# Patient Record
Sex: MALE | Race: BLACK OR AFRICAN AMERICAN | NOT HISPANIC OR LATINO | URBAN - METROPOLITAN AREA
[De-identification: names, ages, dates, MRNs, and addresses within clinical notes are randomized per-mention and may not be internally consistent; named-entity substitution may affect disease eponyms.]

---

## 2022-08-04 ENCOUNTER — EMERGENCY (EMERGENCY)
Facility: HOSPITAL | Age: 42
LOS: 1 days | Discharge: HOME | End: 2022-08-04
Attending: EMERGENCY MEDICINE | Admitting: EMERGENCY MEDICINE

## 2022-08-04 VITALS
SYSTOLIC BLOOD PRESSURE: 160 MMHG | HEART RATE: 95 BPM | DIASTOLIC BLOOD PRESSURE: 100 MMHG | RESPIRATION RATE: 18 BRPM | OXYGEN SATURATION: 98 % | TEMPERATURE: 98 F

## 2022-08-04 PROCEDURE — 99284 EMERGENCY DEPT VISIT MOD MDM: CPT

## 2022-08-04 RX ORDER — ACETAMINOPHEN 500 MG
650 TABLET ORAL ONCE
Refills: 0 | Status: COMPLETED | OUTPATIENT
Start: 2022-08-04 | End: 2022-08-04

## 2022-08-04 RX ORDER — FAMOTIDINE 10 MG/ML
20 INJECTION INTRAVENOUS ONCE
Refills: 0 | Status: COMPLETED | OUTPATIENT
Start: 2022-08-04 | End: 2022-08-04

## 2022-08-04 RX ADMIN — FAMOTIDINE 20 MILLIGRAM(S): 10 INJECTION INTRAVENOUS at 22:30

## 2022-08-04 RX ADMIN — Medication 650 MILLIGRAM(S): at 22:29

## 2022-08-04 NOTE — ED ADULT NURSE NOTE - OBJECTIVE STATEMENT
Pt aox3 with steady gait. walk in pt with complaints of back pain x 2 weeks, states he fell. No difficulty ambulating. Hx htn, been without his meds x 3 days.

## 2022-08-04 NOTE — ED PROVIDER NOTE - OBJECTIVE STATEMENT
41 y/o M walked into ED c/o chronic back pain "up and down my back" and a stomach ache for unspecified amount of time. Pt found sleeping in chair. Once awoken, pt willing to provide very little information, falling asleep mid-conversation more than once. No further information given.

## 2022-08-04 NOTE — ED PROVIDER NOTE - PHYSICAL EXAMINATION
CONSTITUTIONAL: Well appearing, well nourished, NAD  ENT: Airway patent, Nasal mucosa clear. Mouth with normal mucosa.  EYES: Clear bilaterally.  RESPIRATORY: Breathing comfortably with normal RR.  CARDO: RRR  MSK: Range of motion is not limited, no deformities noted.  NEURO: Alert and oriented, no focal deficits. Gait intact.  SKIN: Skin normal color for race, warm, dry and intact. No evidence of rash.

## 2022-08-04 NOTE — ED PROVIDER NOTE - CLINICAL SUMMARY MEDICAL DECISION MAKING FREE TEXT BOX
chronic unspecified back pain, ambulating w/steady gait, treating with oral medications, d/c home with PMD f/u.

## 2022-08-04 NOTE — ED ADULT TRIAGE NOTE - CHIEF COMPLAINT QUOTE
walk in pt with complaints of back pain x 2 weeks, states he fell. No difficulty ambulating. Hx htn, been without his meds x 3 days.

## 2022-08-04 NOTE — ED PROVIDER NOTE - PATIENT PORTAL LINK FT
You can access the FollowMyHealth Patient Portal offered by [facility] by registering at the following website: [email or web address]. By joining [employer]’s FollowMyHealth portal, you will also be able to view your health information using other applications (apps) compatible with our system.

## 2022-08-08 DIAGNOSIS — M54.9 DORSALGIA, UNSPECIFIED: ICD-10-CM

## 2022-08-08 DIAGNOSIS — G89.29 OTHER CHRONIC PAIN: ICD-10-CM

## 2022-09-03 ENCOUNTER — EMERGENCY (EMERGENCY)
Facility: HOSPITAL | Age: 42
LOS: 1 days | Discharge: HOME | End: 2022-09-03
Attending: EMERGENCY MEDICINE | Admitting: EMERGENCY MEDICINE

## 2022-09-03 VITALS
SYSTOLIC BLOOD PRESSURE: 128 MMHG | OXYGEN SATURATION: 97 % | TEMPERATURE: 98 F | HEART RATE: 66 BPM | DIASTOLIC BLOOD PRESSURE: 82 MMHG | WEIGHT: 220.02 LBS | RESPIRATION RATE: 17 BRPM

## 2022-09-03 DIAGNOSIS — G89.29 OTHER CHRONIC PAIN: ICD-10-CM

## 2022-09-03 DIAGNOSIS — M54.9 DORSALGIA, UNSPECIFIED: ICD-10-CM

## 2022-09-03 DIAGNOSIS — Z59.00 HOMELESSNESS UNSPECIFIED: ICD-10-CM

## 2022-09-03 DIAGNOSIS — R46.0 VERY LOW LEVEL OF PERSONAL HYGIENE: ICD-10-CM

## 2022-09-03 PROCEDURE — 99053 MED SERV 10PM-8AM 24 HR FAC: CPT

## 2022-09-03 PROCEDURE — 99284 EMERGENCY DEPT VISIT MOD MDM: CPT

## 2022-09-03 RX ORDER — ACETAMINOPHEN 500 MG
650 TABLET ORAL ONCE
Refills: 0 | Status: COMPLETED | OUTPATIENT
Start: 2022-09-03 | End: 2022-09-03

## 2022-09-03 RX ORDER — IBUPROFEN 200 MG
600 TABLET ORAL ONCE
Refills: 0 | Status: COMPLETED | OUTPATIENT
Start: 2022-09-03 | End: 2022-09-03

## 2022-09-03 RX ADMIN — Medication 650 MILLIGRAM(S): at 02:08

## 2022-09-03 RX ADMIN — Medication 600 MILLIGRAM(S): at 02:08

## 2022-09-03 SDOH — ECONOMIC STABILITY - HOUSING INSECURITY: HOMELESSNESS UNSPECIFIED: Z59.00

## 2022-09-03 NOTE — ED PROVIDER NOTE - PATIENT PORTAL LINK FT
Called granddaughter she stated yes they are switching to [facility] You can access the FollowMyHealth Patient Portal offered by [facility] by registering at the following website: [email or web address]. By joining [employer]’s FollowMyHealth portal, you will also be able to view your health information using other applications (apps) compatible with our system.

## 2022-09-03 NOTE — ED PROVIDER NOTE - OBJECTIVE STATEMENT
41 y/o M walked into ED c/o chronic back pain "up and down my back" unclear timing, no trauma., Pt found sleeping in chair.  denies any red flag signs for back pain. has prior visits for similar.

## 2022-09-03 NOTE — ED PROVIDER NOTE - CLINICAL SUMMARY MEDICAL DECISION MAKING FREE TEXT BOX
pt presents for back pain, is undomiciled and also requesting food. no distress. old chart review shows prior visit for same

## 2022-09-16 ENCOUNTER — EMERGENCY (EMERGENCY)
Facility: HOSPITAL | Age: 42
LOS: 1 days | Discharge: HOME | End: 2022-09-16
Admitting: EMERGENCY MEDICINE

## 2022-09-16 VITALS
RESPIRATION RATE: 17 BRPM | HEART RATE: 65 BPM | OXYGEN SATURATION: 97 % | TEMPERATURE: 98 F | DIASTOLIC BLOOD PRESSURE: 83 MMHG | SYSTOLIC BLOOD PRESSURE: 123 MMHG

## 2022-09-16 DIAGNOSIS — R51.9 HEADACHE, UNSPECIFIED: ICD-10-CM

## 2022-09-16 PROCEDURE — 99283 EMERGENCY DEPT VISIT LOW MDM: CPT

## 2022-09-16 PROCEDURE — 99053 MED SERV 10PM-8AM 24 HR FAC: CPT

## 2022-09-16 RX ORDER — IBUPROFEN 200 MG
600 TABLET ORAL ONCE
Refills: 0 | Status: COMPLETED | OUTPATIENT
Start: 2022-09-16 | End: 2022-09-16

## 2022-09-16 RX ADMIN — Medication 600 MILLIGRAM(S): at 05:16

## 2022-09-16 NOTE — ED PROVIDER NOTE - CLINICAL SUMMARY MEDICAL DECISION MAKING FREE TEXT BOX
pt presents with c/o preston, intermittent x several years, unchanged. requesting motrin. no other neuro complaints.

## 2022-09-16 NOTE — ED PROVIDER NOTE - PATIENT PORTAL LINK FT
You can access the FollowMyHealth Patient Portal offered by [facility] by registering at the following website: [email or web address]. By joining [employer]’s FollowMyHealth portal, you will also be able to view your health information using other applications (apps) compatible with our system.

## 2022-09-16 NOTE — ED PROVIDER NOTE - PHYSICAL EXAMINATION
Constitutional: Well appearing, awake, alert, oriented to person, place, time/situation and in no apparent distress.  HEENT: Airway patent, NCAT  Resp: no conversational dyspnea, tachypnea, or signs of respiratory distress  Msk: ambulating with normal steady gait, no focal deficits  Neuro: A&Ox3

## 2022-09-16 NOTE — ED PROVIDER NOTE - OBJECTIVE STATEMENT
43yo M presents with c/o ha intermittent x 2 years. requesting motrin. denies any changes in ha quality or severity. denies n/v, dizziness, vision changes, numbness, tingling, weakness, any other concerns.

## 2022-09-16 NOTE — ED ADULT TRIAGE NOTE - CHIEF COMPLAINT QUOTE
Pt c/o headache x6 months. Pt states "I get headaches a lot". Pt denies visual changes, weakness or dizziness.

## 2022-10-30 ENCOUNTER — EMERGENCY (EMERGENCY)
Facility: HOSPITAL | Age: 42
LOS: 1 days | Discharge: HOME | End: 2022-10-30
Admitting: STUDENT IN AN ORGANIZED HEALTH CARE EDUCATION/TRAINING PROGRAM
Payer: SELF-PAY

## 2022-10-30 VITALS
HEART RATE: 85 BPM | SYSTOLIC BLOOD PRESSURE: 137 MMHG | DIASTOLIC BLOOD PRESSURE: 86 MMHG | WEIGHT: 220.02 LBS | OXYGEN SATURATION: 97 % | RESPIRATION RATE: 18 BRPM | HEIGHT: 71 IN | TEMPERATURE: 98 F

## 2022-10-30 PROBLEM — Z78.9 OTHER SPECIFIED HEALTH STATUS: Chronic | Status: ACTIVE | Noted: 2022-09-16

## 2022-10-30 PROCEDURE — 99283 EMERGENCY DEPT VISIT LOW MDM: CPT

## 2022-10-30 PROCEDURE — 99284 EMERGENCY DEPT VISIT MOD MDM: CPT

## 2022-10-30 NOTE — ED PROVIDER NOTE - NS ED ROS FT
CONSTITUTIONAL: Well-appearing;  in no apparent distress.   HEAD: Normocephalic; atraumatic.   EYES: PERRL; EOM intact; conjunctiva and sclera clear  ENT: normal nose; no rhinorrhea; normal pharynx with no erythema or lesions.   NECK: Supple; non-tender; no LAD  CARDIOVASCULAR: Normal S1, S2; No audible murmurs. Regular rate and rhythm.   RESPIRATORY: Breathing easily; breath sounds clear and equal bilaterally; no wheezes, rhonchi, or rales.  GI: Soft; non-distended; non-tender; no palpable organomegaly.   MSK: +lower back pain, no radiating   EXT: No cyanosis or edema; N/V intact  SKIN: Normal for age and race; warm; dry; good turgor; no apparent lesions or rash.   NEURO: A & O x 3; face symmetric; grossly unremarkable.   PSYCHOLOGICAL: The patient’s mood and manner are appropriate.

## 2022-10-30 NOTE — ED PROVIDER NOTE - CLINICAL SUMMARY MEDICAL DECISION MAKING FREE TEXT BOX
patient is a 43 y/o male presenting for evaluation of b/l lower back pain. hx of chronic back pain. patient notes has bouts of back pain that come/go, and notes increased back pain over the last few days. notes increased pain after sitting for long periods of time. denies radiation of pain. denies loss of bowel/bladder control. denies f/c/n/v. no recent trauma. denies pain different than his usual back pain. states when he gets rx for ibuprofen and Tylenol, he notes improvement. no further concerns.    well appearing, NAD  vitals noted  PE unremarkable aside from paraspinal pain to palpation of the lumbar spine  no sign of neuro compromise  no midline ttp   FROM of b/l LE.     will treat with NSAIDs.

## 2022-10-30 NOTE — ED PROVIDER NOTE - OBJECTIVE STATEMENT
patient is a 43 y/o male presenting for evaluation of b/l lower back pain. hx of chronic back pain. patient notes has bouts of back pain that come/go, and notes increased back pain over the last few days. notes increased pain after sitting for long periods of time. denies radiation of pain. denies loss of bowel/bladder control. denies f/c/n/v. no recent trauma. denies pain different than his usual back pain. states when he gets rx for ibuprofen and Tylenol, he notes improvement. no further concerns.

## 2022-10-30 NOTE — ED PROVIDER NOTE - PHYSICAL EXAMINATION
CONSTITUTIONAL: Well-appearing;  in no apparent distress.   HEAD: Normocephalic; atraumatic.   EYES: PERRL; EOM intact; conjunctiva and sclera clear  ENT: normal nose; no rhinorrhea; normal pharynx with no erythema or lesions.   NECK: Supple; non-tender; no LAD  CARDIOVASCULAR: Normal S1, S2; No audible murmurs. Regular rate and rhythm.   RESPIRATORY: Breathing easily; breath sounds clear and equal bilaterally; no wheezes, rhonchi, or rales.  GI: Soft; non-distended; non-tender; no palpable organomegaly.   MSK: +ttp of b/l paraspinal region of the lumbar spine without ttp of the midline. FROM at all extremities, normal tone w/o parestehsias, numbness/tingling  EXT: No cyanosis or edema; N/V intact  SKIN: Normal for age and race; warm; dry; good turgor; no apparent lesions or rash.   NEURO: A & O x 3; face symmetric; grossly unremarkable.   PSYCHOLOGICAL: The patient’s mood and manner are appropriate.

## 2022-10-30 NOTE — ED ADULT NURSE NOTE - OBJECTIVE STATEMENT
Patient came to ER with c/o back pain and discomfort with movement. Patient noted to ambulate and move without difficulty.

## 2022-10-30 NOTE — ED PROVIDER NOTE - PATIENT PORTAL LINK FT
You can access the FollowMyHealth Patient Portal offered by [facility] by registering at the following website: [email or web address]. By joining [email or web address]’s FollowMyHealth portal, you will also be able to view your health information using other applications (apps) compatible with our system.

## 2022-10-31 DIAGNOSIS — M54.50 LOW BACK PAIN, UNSPECIFIED: ICD-10-CM

## 2022-10-31 DIAGNOSIS — G89.29 OTHER CHRONIC PAIN: ICD-10-CM

## 2022-11-07 ENCOUNTER — EMERGENCY (EMERGENCY)
Facility: HOSPITAL | Age: 42
LOS: 1 days | Discharge: HOME | End: 2022-11-07
Admitting: STUDENT IN AN ORGANIZED HEALTH CARE EDUCATION/TRAINING PROGRAM
Payer: SELF-PAY

## 2022-11-07 VITALS
RESPIRATION RATE: 18 BRPM | SYSTOLIC BLOOD PRESSURE: 132 MMHG | TEMPERATURE: 98 F | HEART RATE: 72 BPM | OXYGEN SATURATION: 98 % | DIASTOLIC BLOOD PRESSURE: 84 MMHG | WEIGHT: 220.02 LBS | HEIGHT: 71 IN

## 2022-11-07 DIAGNOSIS — R51.9 HEADACHE, UNSPECIFIED: ICD-10-CM

## 2022-11-07 DIAGNOSIS — G89.29 OTHER CHRONIC PAIN: ICD-10-CM

## 2022-11-07 DIAGNOSIS — M54.9 DORSALGIA, UNSPECIFIED: ICD-10-CM

## 2022-11-07 PROCEDURE — 99282 EMERGENCY DEPT VISIT SF MDM: CPT

## 2022-11-07 PROCEDURE — 99284 EMERGENCY DEPT VISIT MOD MDM: CPT

## 2022-11-07 RX ORDER — IBUPROFEN 200 MG
600 TABLET ORAL ONCE
Refills: 0 | Status: DISCONTINUED | OUTPATIENT
Start: 2022-11-07 | End: 2022-11-11

## 2022-11-07 NOTE — ED ADULT TRIAGE NOTE - GLASGOW COMA SCALE: SCORE, MLM
How Severe Are Your Spot(S)?: mild Have Your Spot(S) Been Treated In The Past?: has not been treated Hpi Title: Evaluation of Skin Lesions 15

## 2022-11-07 NOTE — ED PROVIDER NOTE - CLINICAL SUMMARY MEDICAL DECISION MAKING FREE TEXT BOX
41 yo m c/o chronic has and chronic back pain. pt states he comes to the ED for motrin or tylenol because he cannot afford it. Reports chronic HAs for months, had CT in the past that was neg. Pt also c/o generalized back pain that comes and goes. Denies trauma. Denies fever, chills, n/v, dizziness, neck pain, abd pain, dysuria, hematuria, incontinence. VSs. Well appearing. Neck supple, Neurologically intact. No tenderness to back. Walking normally. Given motrin. Encouraged f/u

## 2022-11-07 NOTE — ED PROVIDER NOTE - OBJECTIVE STATEMENT
43 yo m c/o chronic has and chronic back pain. pt states he comes to the ED for motrin or tylenol because he cannot afford it. Reports chronic HAs for months, had CT in the past that was neg. Pt also c/o generalized back pain that comes and goes. Denies trauma. Denies fever, chills, n/v, dizziness, neck pain, abd pain, dysuria, hematuria, incontinence.

## 2022-11-07 NOTE — ED PROVIDER NOTE - PATIENT PORTAL LINK FT
You can access the FollowMyHealth Patient Portal offered by [facility] by registering at the following website: [email or web address]. By joining [employer]’s FollowMyHealth portal, you will also be able to view your health information using other applications (apps) compatible with our system.

## 2022-11-07 NOTE — ED PROVIDER NOTE - PHYSICAL EXAMINATION
CONSTITUTIONAL: Well-appearing;  in no apparent distress.   HEAD: Normocephalic; atraumatic.   EYES: PERRL; EOM intact; conjunctiva and sclera clear  ENT: normal nose; no rhinorrhea; normal pharynx with no erythema or lesions.   NECK: Supple; non-tender;   CARDIOVASCULAR: rrr,  RESPIRATORY: Breathing easily;   MSK: FROM at all extremities, normal tone   EXT: No cyanosis or edema; N/V intact  SKIN: Normal for age and race; warm; dry; good turgor; no apparent lesions or rash.  neuro: AAO x 3, CN II-XII intact, normal speech, strength 5/5 bilateral upper and lower extremities, sensation intact, cerebellum intact, no ataxia, follows commands appropriately

## 2022-11-13 ENCOUNTER — INPATIENT (INPATIENT)
Facility: HOSPITAL | Age: 42
LOS: 2 days | Discharge: HOME | DRG: 552 | End: 2022-11-16
Attending: HOSPITALIST | Admitting: HOSPITALIST
Payer: SELF-PAY

## 2022-11-13 VITALS
TEMPERATURE: 98 F | SYSTOLIC BLOOD PRESSURE: 134 MMHG | HEIGHT: 71 IN | WEIGHT: 220.02 LBS | DIASTOLIC BLOOD PRESSURE: 88 MMHG | HEART RATE: 56 BPM | RESPIRATION RATE: 16 BRPM | OXYGEN SATURATION: 97 %

## 2022-11-13 PROCEDURE — 99285 EMERGENCY DEPT VISIT HI MDM: CPT

## 2022-11-13 PROCEDURE — 93010 ELECTROCARDIOGRAM REPORT: CPT

## 2022-11-13 RX ORDER — SODIUM CHLORIDE 9 MG/ML
1000 INJECTION INTRAMUSCULAR; INTRAVENOUS; SUBCUTANEOUS ONCE
Refills: 0 | Status: COMPLETED | OUTPATIENT
Start: 2022-11-13 | End: 2022-11-13

## 2022-11-13 RX ORDER — METOCLOPRAMIDE HCL 10 MG
10 TABLET ORAL ONCE
Refills: 0 | Status: COMPLETED | OUTPATIENT
Start: 2022-11-13 | End: 2022-11-13

## 2022-11-13 RX ORDER — DIPHENHYDRAMINE HCL 50 MG
25 CAPSULE ORAL ONCE
Refills: 0 | Status: COMPLETED | OUTPATIENT
Start: 2022-11-13 | End: 2022-11-13

## 2022-11-13 RX ORDER — KETOROLAC TROMETHAMINE 30 MG/ML
15 SYRINGE (ML) INJECTION ONCE
Refills: 0 | Status: DISCONTINUED | OUTPATIENT
Start: 2022-11-13 | End: 2022-11-13

## 2022-11-13 RX ADMIN — Medication 15 MILLIGRAM(S): at 23:56

## 2022-11-13 RX ADMIN — Medication 25 MILLIGRAM(S): at 23:56

## 2022-11-13 NOTE — ED ADULT NURSE NOTE - NSIMPLEMENTINTERV_GEN_ALL_ED
Implemented All Fall with Harm Risk Interventions:  [place] to call system. Call bell, personal items and telephone within reach. Instruct patient to call for assistance. Room bathroom lighting operational. Non-slip footwear when patient is off stretcher. Physically safe environment: no spills, clutter or unnecessary equipment. Stretcher in lowest position, wheels locked, appropriate side rails in place. Provide visual cue, wrist band, yellow gown, etc. Monitor gait and stability. Monitor for mental status changes and reorient to person, place, and time. Review medications for side effects contributing to fall risk. Reinforce activity limits and safety measures with patient and family. Provide visual clues: red socks. Implemented All Fall with Harm Risk Interventions:  [place] to call system. Call bell, personal items and telephone within reach. Instruct patient to call for assistance. Room bathroom lighting operational. Non-slip footwear when patient is off stretcher. Physically safe environment: no spills, clutter or unnecessary equipment. Stretcher in lowest position, wheels locked, appropriate side rails in place. Provide visual cue, wrist band, yellow gown, etc. Monitor gait and stability. Monitor for mental status changes and reorient to person, place, and time. Review medications for side effects contributing to fall risk. Reinforce activity limits and safety measures with patient and family. Provide visual clues: red socks. Implemented All Fall with Harm Risk Interventions:  [place] to call system. Call bell, personal items and telephone within reach. Instruct patient to call for assistance. Room bathroom lighting operational. Non-slip footwear when patient is off stretcher. Physically safe environment: no spills, clutter or unnecessary equipment. Stretcher in lowest position, wheels locked, appropriate side rails in place. Provide visual cue, wrist band, yellow gown, etc. Monitor gait and stability. Monitor for mental status changes and reorient to person, place, and time. Review medications for side effects contributing to fall risk. Reinforce activity limits and safety measures with patient and family. Provide visual clues: red socks.

## 2022-11-13 NOTE — ED ADULT NURSE NOTE - OBJECTIVE STATEMENT
41 y/o male presents to the ED ambulatory endorsing symptoms of diffuse back pain and HA x6 months. Back is tender to palpation. A/Ox4. Ambulatory. PMH: none. Patient denies any daily medications. Patient states that normally the pain is 3/10 but it has increased to 5/10 and that's what made him come to the ED. Patient endorses subjective fever and chills. Patient states on 11/12/22, he took acetaminophen. Patient endorses recent fall two weeks and positive headstrike. Safety measures maintained, call-bell within reach, side rails intact and bed in the lowest position. 43 y/o male presents to the ED ambulatory endorsing symptoms of diffuse back pain and HA x6 months. Back is tender to palpation. A/Ox4. Ambulatory. PMH: none. Patient denies any daily medications. Patient states that normally the pain is 3/10 but it has increased to 5/10 and that's what made him come to the ED. Patient endorses subjective fever and chills. Patient states on 11/12/22, he took acetaminophen. Patient endorses recent fall two weeks and positive headstrike. Safety measures maintained, call-bell within reach, side rails intact and bed in the lowest position.

## 2022-11-13 NOTE — ED ADULT NURSE NOTE - CHPI ED NUR SYMPTOMS NEG
no difficulty bearing weight/no fatigue/no motor function loss/no neck tenderness/no numbness/no tingling

## 2022-11-13 NOTE — ED ADULT NURSE NOTE - CHIEF COMPLAINT
Per patient's request detailed voicemail was left for patient regarding lab results below. Patient form was faxed to insurance and hard copy was mailed out to patient.    The patient is a 42y Male complaining of back pain general.

## 2022-11-14 DIAGNOSIS — F31.9 BIPOLAR DISORDER, UNSPECIFIED: ICD-10-CM

## 2022-11-14 DIAGNOSIS — R00.1 BRADYCARDIA, UNSPECIFIED: ICD-10-CM

## 2022-11-14 DIAGNOSIS — E16.2 HYPOGLYCEMIA, UNSPECIFIED: ICD-10-CM

## 2022-11-14 DIAGNOSIS — D72.829 ELEVATED WHITE BLOOD CELL COUNT, UNSPECIFIED: ICD-10-CM

## 2022-11-14 DIAGNOSIS — G43.909 MIGRAINE, UNSPECIFIED, NOT INTRACTABLE, WITHOUT STATUS MIGRAINOSUS: ICD-10-CM

## 2022-11-14 LAB
A1C WITH ESTIMATED AVERAGE GLUCOSE RESULT: 5.3 % — SIGNIFICANT CHANGE UP (ref 4–5.6)
ALBUMIN SERPL ELPH-MCNC: 3.6 G/DL — SIGNIFICANT CHANGE UP (ref 3.3–5)
ALBUMIN SERPL ELPH-MCNC: 4.2 G/DL — SIGNIFICANT CHANGE UP (ref 3.3–5)
ALP SERPL-CCNC: 60 U/L — SIGNIFICANT CHANGE UP (ref 40–120)
ALP SERPL-CCNC: 73 U/L — SIGNIFICANT CHANGE UP (ref 40–120)
ALT FLD-CCNC: 13 U/L — SIGNIFICANT CHANGE UP (ref 10–45)
ALT FLD-CCNC: 15 U/L — SIGNIFICANT CHANGE UP (ref 10–45)
AMPHET UR-MCNC: NEGATIVE — SIGNIFICANT CHANGE UP
ANION GAP SERPL CALC-SCNC: 11 MMOL/L — SIGNIFICANT CHANGE UP (ref 5–17)
ANION GAP SERPL CALC-SCNC: 12 MMOL/L — SIGNIFICANT CHANGE UP (ref 5–17)
APPEARANCE UR: CLEAR — SIGNIFICANT CHANGE UP
AST SERPL-CCNC: 13 U/L — SIGNIFICANT CHANGE UP (ref 10–40)
AST SERPL-CCNC: 23 U/L — SIGNIFICANT CHANGE UP (ref 10–40)
BACTERIA # UR AUTO: NEGATIVE — SIGNIFICANT CHANGE UP
BARBITURATES UR SCN-MCNC: NEGATIVE — SIGNIFICANT CHANGE UP
BASOPHILS # BLD AUTO: 0.07 K/UL — SIGNIFICANT CHANGE UP (ref 0–0.2)
BASOPHILS # BLD AUTO: 0.08 K/UL — SIGNIFICANT CHANGE UP (ref 0–0.2)
BASOPHILS NFR BLD AUTO: 0.5 % — SIGNIFICANT CHANGE UP (ref 0–2)
BASOPHILS NFR BLD AUTO: 0.7 % — SIGNIFICANT CHANGE UP (ref 0–2)
BENZODIAZ UR-MCNC: NEGATIVE — SIGNIFICANT CHANGE UP
BILIRUB SERPL-MCNC: 0.8 MG/DL — SIGNIFICANT CHANGE UP (ref 0.2–1.2)
BILIRUB UR-MCNC: ABNORMAL
BUN SERPL-MCNC: 13 MG/DL — SIGNIFICANT CHANGE UP (ref 7–23)
BUN SERPL-MCNC: 14 MG/DL — SIGNIFICANT CHANGE UP (ref 7–23)
C PEPTIDE SERPL-MCNC: 6.2 NG/ML — HIGH (ref 1.1–4.4)
CALCIUM SERPL-MCNC: 8.6 MG/DL — SIGNIFICANT CHANGE UP (ref 8.4–10.5)
CALCIUM SERPL-MCNC: 9.3 MG/DL — SIGNIFICANT CHANGE UP (ref 8.4–10.5)
CHLORIDE SERPL-SCNC: 101 MMOL/L — SIGNIFICANT CHANGE UP (ref 96–108)
CHLORIDE SERPL-SCNC: 104 MMOL/L — SIGNIFICANT CHANGE UP (ref 96–108)
CK SERPL-CCNC: 313 U/L — HIGH (ref 30–200)
CO2 SERPL-SCNC: 24 MMOL/L — SIGNIFICANT CHANGE UP (ref 22–31)
CO2 SERPL-SCNC: 27 MMOL/L — SIGNIFICANT CHANGE UP (ref 22–31)
COCAINE METAB.OTHER UR-MCNC: NEGATIVE — SIGNIFICANT CHANGE UP
COLOR SPEC: YELLOW — SIGNIFICANT CHANGE UP
CREAT SERPL-MCNC: 0.72 MG/DL — SIGNIFICANT CHANGE UP (ref 0.5–1.3)
CREAT SERPL-MCNC: 0.76 MG/DL — SIGNIFICANT CHANGE UP (ref 0.5–1.3)
DIFF PNL FLD: NEGATIVE — SIGNIFICANT CHANGE UP
EGFR: 114 ML/MIN/1.73M2 — SIGNIFICANT CHANGE UP
EGFR: 116 ML/MIN/1.73M2 — SIGNIFICANT CHANGE UP
EOSINOPHIL # BLD AUTO: 0.76 K/UL — HIGH (ref 0–0.5)
EOSINOPHIL # BLD AUTO: 0.84 K/UL — HIGH (ref 0–0.5)
EOSINOPHIL NFR BLD AUTO: 5.1 % — SIGNIFICANT CHANGE UP (ref 0–6)
EOSINOPHIL NFR BLD AUTO: 8 % — HIGH (ref 0–6)
EPI CELLS # UR: 0 /HPF — SIGNIFICANT CHANGE UP
ESTIMATED AVERAGE GLUCOSE: 105 MG/DL — SIGNIFICANT CHANGE UP (ref 68–114)
FLUAV AG NPH QL: SIGNIFICANT CHANGE UP
FLUBV AG NPH QL: SIGNIFICANT CHANGE UP
GLUCOSE BLDC GLUCOMTR-MCNC: 100 MG/DL — HIGH (ref 70–99)
GLUCOSE BLDC GLUCOMTR-MCNC: 109 MG/DL — HIGH (ref 70–99)
GLUCOSE BLDC GLUCOMTR-MCNC: 149 MG/DL — HIGH (ref 70–99)
GLUCOSE BLDC GLUCOMTR-MCNC: 150 MG/DL — HIGH (ref 70–99)
GLUCOSE BLDC GLUCOMTR-MCNC: 67 MG/DL — LOW (ref 70–99)
GLUCOSE BLDC GLUCOMTR-MCNC: 85 MG/DL — SIGNIFICANT CHANGE UP (ref 70–99)
GLUCOSE BLDC GLUCOMTR-MCNC: 94 MG/DL — SIGNIFICANT CHANGE UP (ref 70–99)
GLUCOSE SERPL-MCNC: 126 MG/DL — HIGH (ref 70–99)
GLUCOSE SERPL-MCNC: 66 MG/DL — LOW (ref 70–99)
GLUCOSE UR QL: NEGATIVE — SIGNIFICANT CHANGE UP
HCT VFR BLD CALC: 45.6 % — SIGNIFICANT CHANGE UP (ref 39–50)
HCT VFR BLD CALC: 49.6 % — SIGNIFICANT CHANGE UP (ref 39–50)
HGB BLD-MCNC: 14.8 G/DL — SIGNIFICANT CHANGE UP (ref 13–17)
HGB BLD-MCNC: 16.2 G/DL — SIGNIFICANT CHANGE UP (ref 13–17)
HYALINE CASTS # UR AUTO: 1 /LPF — SIGNIFICANT CHANGE UP (ref 0–2)
IMM GRANULOCYTES NFR BLD AUTO: 0.6 % — SIGNIFICANT CHANGE UP (ref 0–0.9)
IMM GRANULOCYTES NFR BLD AUTO: 0.7 % — SIGNIFICANT CHANGE UP (ref 0–0.9)
KETONES UR-MCNC: ABNORMAL
LEUKOCYTE ESTERASE UR-ACNC: NEGATIVE — SIGNIFICANT CHANGE UP
LYMPHOCYTES # BLD AUTO: 16.6 % — SIGNIFICANT CHANGE UP (ref 13–44)
LYMPHOCYTES # BLD AUTO: 2.26 K/UL — SIGNIFICANT CHANGE UP (ref 1–3.3)
LYMPHOCYTES # BLD AUTO: 2.47 K/UL — SIGNIFICANT CHANGE UP (ref 1–3.3)
LYMPHOCYTES # BLD AUTO: 21.5 % — SIGNIFICANT CHANGE UP (ref 13–44)
MAGNESIUM SERPL-MCNC: 1.9 MG/DL — SIGNIFICANT CHANGE UP (ref 1.6–2.6)
MCHC RBC-ENTMCNC: 29.6 PG — SIGNIFICANT CHANGE UP (ref 27–34)
MCHC RBC-ENTMCNC: 29.7 PG — SIGNIFICANT CHANGE UP (ref 27–34)
MCHC RBC-ENTMCNC: 32.5 GM/DL — SIGNIFICANT CHANGE UP (ref 32–36)
MCHC RBC-ENTMCNC: 32.7 GM/DL — SIGNIFICANT CHANGE UP (ref 32–36)
MCV RBC AUTO: 90.7 FL — SIGNIFICANT CHANGE UP (ref 80–100)
MCV RBC AUTO: 91.4 FL — SIGNIFICANT CHANGE UP (ref 80–100)
METHADONE UR-MCNC: NEGATIVE — SIGNIFICANT CHANGE UP
MONOCYTES # BLD AUTO: 1.27 K/UL — HIGH (ref 0–0.9)
MONOCYTES # BLD AUTO: 1.36 K/UL — HIGH (ref 0–0.9)
MONOCYTES NFR BLD AUTO: 12.1 % — SIGNIFICANT CHANGE UP (ref 2–14)
MONOCYTES NFR BLD AUTO: 9.2 % — SIGNIFICANT CHANGE UP (ref 2–14)
NEUTROPHILS # BLD AUTO: 10.09 K/UL — HIGH (ref 1.8–7.4)
NEUTROPHILS # BLD AUTO: 5.99 K/UL — SIGNIFICANT CHANGE UP (ref 1.8–7.4)
NEUTROPHILS NFR BLD AUTO: 57 % — SIGNIFICANT CHANGE UP (ref 43–77)
NEUTROPHILS NFR BLD AUTO: 68 % — SIGNIFICANT CHANGE UP (ref 43–77)
NITRITE UR-MCNC: NEGATIVE — SIGNIFICANT CHANGE UP
NRBC # BLD: 0 /100 WBCS — SIGNIFICANT CHANGE UP (ref 0–0)
NT-PROBNP SERPL-SCNC: 24 PG/ML — SIGNIFICANT CHANGE UP (ref 0–300)
OPIATES UR-MCNC: NEGATIVE — SIGNIFICANT CHANGE UP
OXYCODONE UR-MCNC: NEGATIVE — SIGNIFICANT CHANGE UP
PCP SPEC-MCNC: SIGNIFICANT CHANGE UP
PCP UR-MCNC: NEGATIVE — SIGNIFICANT CHANGE UP
PH UR: 6 — SIGNIFICANT CHANGE UP (ref 5–8)
PHOSPHATE SERPL-MCNC: 2.9 MG/DL — SIGNIFICANT CHANGE UP (ref 2.5–4.5)
PLATELET # BLD AUTO: 448 K/UL — HIGH (ref 150–400)
PLATELET # BLD AUTO: 486 K/UL — HIGH (ref 150–400)
POTASSIUM SERPL-MCNC: 3.3 MMOL/L — LOW (ref 3.5–5.3)
POTASSIUM SERPL-MCNC: 3.8 MMOL/L — SIGNIFICANT CHANGE UP (ref 3.5–5.3)
POTASSIUM SERPL-SCNC: 3.3 MMOL/L — LOW (ref 3.5–5.3)
POTASSIUM SERPL-SCNC: 3.8 MMOL/L — SIGNIFICANT CHANGE UP (ref 3.5–5.3)
PROT SERPL-MCNC: 6.2 G/DL — SIGNIFICANT CHANGE UP (ref 6–8.3)
PROT SERPL-MCNC: 7.2 G/DL — SIGNIFICANT CHANGE UP (ref 6–8.3)
PROT UR-MCNC: ABNORMAL
RBC # BLD: 4.99 M/UL — SIGNIFICANT CHANGE UP (ref 4.2–5.8)
RBC # BLD: 5.47 M/UL — SIGNIFICANT CHANGE UP (ref 4.2–5.8)
RBC # FLD: 14.9 % — HIGH (ref 10.3–14.5)
RBC # FLD: 15 % — HIGH (ref 10.3–14.5)
RBC CASTS # UR COMP ASSIST: 5 /HPF — HIGH (ref 0–4)
RSV RNA NPH QL NAA+NON-PROBE: SIGNIFICANT CHANGE UP
SARS-COV-2 RNA SPEC QL NAA+PROBE: SIGNIFICANT CHANGE UP
SODIUM SERPL-SCNC: 139 MMOL/L — SIGNIFICANT CHANGE UP (ref 135–145)
SODIUM SERPL-SCNC: 140 MMOL/L — SIGNIFICANT CHANGE UP (ref 135–145)
SP GR SPEC: 1.04 — HIGH (ref 1.01–1.02)
THC UR QL: POSITIVE
TROPONIN T, HIGH SENSITIVITY RESULT: 9 NG/L — SIGNIFICANT CHANGE UP (ref 0–51)
TSH SERPL-MCNC: 1.84 UIU/ML — SIGNIFICANT CHANGE UP (ref 0.27–4.2)
UROBILINOGEN FLD QL: ABNORMAL
VIT B12 SERPL-MCNC: <150 PG/ML — LOW (ref 232–1245)
WBC # BLD: 10.5 K/UL — SIGNIFICANT CHANGE UP (ref 3.8–10.5)
WBC # BLD: 14.85 K/UL — HIGH (ref 3.8–10.5)
WBC # FLD AUTO: 10.5 K/UL — SIGNIFICANT CHANGE UP (ref 3.8–10.5)
WBC # FLD AUTO: 14.85 K/UL — HIGH (ref 3.8–10.5)
WBC UR QL: 2 /HPF — SIGNIFICANT CHANGE UP (ref 0–5)

## 2022-11-14 PROCEDURE — 71046 X-RAY EXAM CHEST 2 VIEWS: CPT | Mod: 26

## 2022-11-14 PROCEDURE — 99223 1ST HOSP IP/OBS HIGH 75: CPT

## 2022-11-14 PROCEDURE — 70450 CT HEAD/BRAIN W/O DYE: CPT | Mod: 26,MA

## 2022-11-14 RX ORDER — DEXTROSE 50 % IN WATER 50 %
12.5 SYRINGE (ML) INTRAVENOUS ONCE
Refills: 0 | Status: DISCONTINUED | OUTPATIENT
Start: 2022-11-14 | End: 2022-11-16

## 2022-11-14 RX ORDER — POTASSIUM CHLORIDE 20 MEQ
40 PACKET (EA) ORAL EVERY 4 HOURS
Refills: 0 | Status: COMPLETED | OUTPATIENT
Start: 2022-11-14 | End: 2022-11-14

## 2022-11-14 RX ORDER — ACETAMINOPHEN 500 MG
650 TABLET ORAL EVERY 6 HOURS
Refills: 0 | Status: DISCONTINUED | OUTPATIENT
Start: 2022-11-14 | End: 2022-11-16

## 2022-11-14 RX ORDER — INFLUENZA VIRUS VACCINE 15; 15; 15; 15 UG/.5ML; UG/.5ML; UG/.5ML; UG/.5ML
0.5 SUSPENSION INTRAMUSCULAR ONCE
Refills: 0 | Status: DISCONTINUED | OUTPATIENT
Start: 2022-11-14 | End: 2022-11-16

## 2022-11-14 RX ORDER — DEXTROSE 50 % IN WATER 50 %
25 SYRINGE (ML) INTRAVENOUS ONCE
Refills: 0 | Status: DISCONTINUED | OUTPATIENT
Start: 2022-11-14 | End: 2022-11-16

## 2022-11-14 RX ORDER — DEXTROSE 50 % IN WATER 50 %
15 SYRINGE (ML) INTRAVENOUS ONCE
Refills: 0 | Status: DISCONTINUED | OUTPATIENT
Start: 2022-11-14 | End: 2022-11-16

## 2022-11-14 RX ORDER — SODIUM CHLORIDE 9 MG/ML
1000 INJECTION, SOLUTION INTRAVENOUS
Refills: 0 | Status: DISCONTINUED | OUTPATIENT
Start: 2022-11-14 | End: 2022-11-16

## 2022-11-14 RX ORDER — GLUCAGON INJECTION, SOLUTION 0.5 MG/.1ML
1 INJECTION, SOLUTION SUBCUTANEOUS ONCE
Refills: 0 | Status: DISCONTINUED | OUTPATIENT
Start: 2022-11-14 | End: 2022-11-16

## 2022-11-14 RX ADMIN — Medication 15 MILLIGRAM(S): at 00:10

## 2022-11-14 RX ADMIN — SODIUM CHLORIDE 1000 MILLILITER(S): 9 INJECTION INTRAMUSCULAR; INTRAVENOUS; SUBCUTANEOUS at 00:51

## 2022-11-14 RX ADMIN — Medication 40 MILLIEQUIVALENT(S): at 13:38

## 2022-11-14 RX ADMIN — Medication 10 MILLIGRAM(S): at 00:00

## 2022-11-14 RX ADMIN — SODIUM CHLORIDE 200 MILLILITER(S): 9 INJECTION, SOLUTION INTRAVENOUS at 06:51

## 2022-11-14 RX ADMIN — Medication 40 MILLIEQUIVALENT(S): at 10:52

## 2022-11-14 NOTE — H&P ADULT - ASSESSMENT
42M c hx bipolar disorder, migraine, htn, chronic back pain, current smoker, pw bradycardia, hypoglycemia, leukocytosis

## 2022-11-14 NOTE — PATIENT PROFILE ADULT - FALL HARM RISK - UNIVERSAL INTERVENTIONS
Bed in lowest position, wheels locked, appropriate side rails in place/Call bell, personal items and telephone in reach/Instruct patient to call for assistance before getting out of bed or chair/Non-slip footwear when patient is out of bed/[place] to call system/Physically safe environment - no spills, clutter or unnecessary equipment/Purposeful Proactive Rounding/Room/bathroom lighting operational, light cord in reach Bed in lowest position, wheels locked, appropriate side rails in place/Call bell, personal items and telephone in reach/Instruct patient to call for assistance before getting out of bed or chair/Non-slip footwear when patient is out of bed/[place] to call system/Physically safe environment - no spills, clutter or unnecessary equipment/Purposeful Proactive Rounding/Room/bathroom lighting operational, light cord in reach Bed in lowest position, wheels locked, appropriate side rails in place/Call bell, personal items and telephone in reach/Instruct patient to call for assistance before getting out of bed or chair/Non-slip footwear when patient is out of bed/[place] to call system/Physically safe environment - no spills, clutter or unnecessary equipment/Purposeful Proactive Rounding/Room/bathroom lighting operational, light cord in reach

## 2022-11-14 NOTE — ED CLERICAL - DIVISION
[facility]... [facility]... [facility]...

## 2022-11-14 NOTE — ED ADULT NURSE REASSESSMENT NOTE - NS ED NURSE REASSESS COMMENT FT1
Patient's FS 67. Patient given 2 cups of juice and crackers. NP Rodericki at bedside. NP states they will put it in hypoglycemic protocol.

## 2022-11-14 NOTE — H&P ADULT - NSHPLABSRESULTS_GEN_ALL_CORE
Personally reviewed old records.  Personally reviewed labs.  Personally reviewed imaging.  Personally reviewed EKG. NSR rate 38, . . <1mm DUNCAN in V2-V3. No TW changes or Q waves.                          16.2   14.85 )-----------( 486      ( 2022 23:58 )             49.6       11-13    139  |  101  |  14  ----------------------------<  66<L>  3.8   |  27  |  0.72    Ca    9.3      2022 23:58    TPro  7.2  /  Alb  4.2  /  TBili  0.8  /  DBili  x   /  AST  23  /  ALT  15  /  AlkPhos  73  11-13            LIVER FUNCTIONS - ( 2022 23:58 )  Alb: 4.2 g/dL / Pro: 7.2 g/dL / ALK PHOS: 73 U/L / ALT: 15 U/L / AST: 23 U/L / GGT: x                 Urinalysis Basic - ( 2022 02:35 )    Color: Yellow / Appearance: Clear / S.036 / pH: x  Gluc: x / Ketone: Small  / Bili: Small / Urobili: 6 mg/dL   Blood: x / Protein: Trace / Nitrite: Negative   Leuk Esterase: Negative / RBC: 5 /hpf / WBC 2 /HPF   Sq Epi: x / Non Sq Epi: 0 /hpf / Bacteria: Negative

## 2022-11-14 NOTE — ED ADULT NURSE REASSESSMENT NOTE - NS ED NURSE REASSESS COMMENT FT1
Patient is a/ox3, sleeping in the stretcher. Patient is aroused to verbal and painful stimuli. Patient is able to follow commands. Patient is on CM, NSR 70s. Repeat FS is 150. Safety measures maintained, red socks on, call bell within reach and side rails intact.

## 2022-11-14 NOTE — CHART NOTE - NSCHARTNOTEFT_GEN_A_CORE
Medicine Np note    CC: AMS    Notified by RN That patient with change in mental status  Evaluated the pta t bedside, pt currently a&ox4, follows verbal commands, denies, HA, dizziness, SOB, CP  ED note reviewed, patient with lethargy from Mn  CTH negative for acute bleed/infarct  F/S done now with result 66 mg/dl  Serum Blood glucose 66 mg/dl from MN  Patient back to baseline after having juice and snacks  No hx of DM per pt    # Symptomatic Hypoglycemia  F/S done now with result 66 mg/dl  Serum Blood glucose 66 mg/dl from MN  Patient back to baseline after having juice and snacks  F/S q6 hourly  Hypoglycemia protocol initiated,  SB to SR on tele  Continue tele  a1c ordered  Will continue to monitor  Will sign out to day team    Vielka Weir P BC  [id number] Medicine Np note    CC: AMS    Notified by RN That patient with change in mental status  Evaluated the pta t bedside, pt currently a&ox4, follows verbal commands, denies, HA, dizziness, SOB, CP  ED note reviewed, patient with lethargy from Mn  CTH negative for acute bleed/infarct  F/S done now with result 66 mg/dl  Serum Blood glucose 66 mg/dl from MN  Patient back to baseline after having juice and snacks  No hx of DM per pt    # Symptomatic Hypoglycemia  F/S done now with result 66 mg/dl  Serum Blood glucose 66 mg/dl from MN  Patient back to baseline after having juice and snacks  F/S q6 hourly  Hypoglycemia protocol initiated,  SB to SR on tele  Continue tele  a1c ordered  Will continue to monitor  Will sign out to day team    Vielka Weir P BC  [id number] Medicine Np note    CC: AMS    Notified by RN That patient with change in mental status  Evaluated the pta t bedside, pt currently a&ox4, follows verbal commands, denies, HA, dizziness, SOB, CP  ED note reviewed, patient with lethargy from Mn  CTH negative for acute bleed/infarct  F/S done now with result 66 mg/dl  Serum Blood glucose 66 mg/dl from MN  Patient back to baseline after having juice and snacks  No hx of DM per pt    # Symptomatic Hypoglycemia  F/S done now with result 66 mg/dl  Serum Blood glucose 66 mg/dl from MN  Patient back to baseline after having juice and snacks  F/S q6 hourly  Hypoglycemia protocol initiated,  SB to SR on tele  Continue tele  a1c ordered  Will continue to monitor  Will sign out to day team    Vielka Weir P BC  [id number]

## 2022-11-14 NOTE — PATIENT PROFILE ADULT - NSPROGENSOURCEINFO_GEN_A_NUR

## 2022-11-14 NOTE — ED PROVIDER NOTE - CLINICAL SUMMARY MEDICAL DECISION MAKING FREE TEXT BOX
42Y M PMH headaches presenting with several months of back pain and headaches, worsening today. He reports falling 2 weeks ago but did not seek medical care at that time. Low back pain is chronic. Patient appears lethargic, requiring stimulation to be aroused. Salvador to 40s on vitals check, placed on monitor. EKG obtained showing marked sinus salvador, intervals within normal limits.

## 2022-11-14 NOTE — H&P ADULT - HISTORY OF PRESENT ILLNESS
42M c hx bipolar disorder, migraine, htn, chronic back pain, current smoker, pw headache and back pain.    Pt sleeping on my arrival at bedside. On calling his name and tapping him, pt would awake briefly, answer appropriately but not reliably with few words, then go back to sleep. Pt unable to remain consistently awake.  pt does not remember the name of any of his doctors, and does not remember the phone number of any relatives. Pt also does not know any of his medications that he takes.  Pt states he lives in [place], but is in [place] visiting to "see sights". Pt states that he's had migraine headache for a very long time, and has chronic back pain, but came to the hospital despite these symptoms not being worse than usual for him. Pt denies fevers, chills, CP.  While in the ED, pt found to have bradycardiac to the 30s and hypoglycemic to 66. Denies IV drug use. Last drink of ETOH was 2 weeks ago. 42M c hx bipolar disorder, migraine, htn, chronic back pain, current smoker, pw headache and back pain.    Pt sleeping on my arrival at bedside. On calling his name and tapping him, pt would awake briefly, answer appropriately but not reliably with few words, then go back to sleep. Pt unable to remain consistently awake.  pt does not remember the name of any of his doctors, and does not remember the phone number of any relatives. Pt also does not know any of his medications that he takes.  Pt states he lives in [place], but is in [place] visiting to "see sights". Pt states that he's had migraine headache for a very long time, and has chronic back pain, but came to the hospital despite these symptoms not being worse than usual for him. Pt denies fevers, chills, CP.  While in the ED, pt found to have bradycardiac to the 30s and hypoglycemic to 66. Denies IV drug use. Last drink of ETOH was 2 weeks ago. 42M c hx bipolar disorder, migraine, htn, chronic back pain, current smoker, pw headache and back pain.    Pt sleeping on my arrival at bedside. On calling his name and tapping him, pt would awake briefly, answer appropriately but not reliably with few words, then go back to sleep. Pt unable to remain consistently awake.  pt does not remember the name of any of his doctors, and does not remember the phone number of any relatives. Pt also does not know any of his medications that he takes.  Pt states he lives in [place], but is in [place] visiting to "see sights". Pt states that he's had migraine headache for a very long time, and has chronic back pain, but came to the hospital despite these symptoms not being worse than usual for him. Pt denies fevers, chills, CP.  While in the ED, pt found to have bradycardiac to the 30s and hypoglycemic to 66. Denies IV drug use. Last drink of ETOH was 2 weeks ago.

## 2022-11-14 NOTE — H&P ADULT - PROBLEM SELECTOR PLAN 2
- a1c, tsh, cortisol  - cont to monitor FSBS qachs  - if pt becomes hypoglycemic again, would do further workup with cpeptide, proinsulin - a1c, tsh, cortisol  - cont to monitor FSBS qachs  - cpeptide, proinsulin

## 2022-11-14 NOTE — PATIENT PROFILE ADULT - FUNCTIONAL ASSESSMENT - BASIC MOBILITY 5.
[FreeTextEntry1] : referred to Dr. kris Tolliver (podiatry), Dr. Daphnie Mas (PCP) 4 = No assist / stand by assistance

## 2022-11-14 NOTE — ED PROVIDER NOTE - PROGRESS NOTE DETAILS
Claudia Alvarez MD PGY1: patient persistently salvador, HR in 30s-40s. UDS ordered, pos for THC otherwise negative. Remainder of labs non actionable. Head CT unremarkable. Planning for admission for persistent bradycardia

## 2022-11-14 NOTE — ED ADULT NURSE REASSESSMENT NOTE - NS ED NURSE REASSESS COMMENT FT1
MD Alvarez aware of patient's lethargic state. Patient denies drug or alcohol use. No new orders at this time.

## 2022-11-14 NOTE — H&P ADULT - NSHPSOCIALHISTORY_GEN_ALL_CORE
Social History:    Marital Status: (  ) , ( x ) Single, (  ) , (  ) , (  )   # of Children: 3  Lives with: ( x ) alone, (  ) children, (  ) spouse, (  ) parents, (  ) siblings, (  ) friends, (  ) other:   Occupation:     Substance Use/Illicit Drugs: (  ) never used vs other:   Tobacco Usage: (  ) never smoked, (  ) former smoker, ( x ) current smoker and Total Pack-Years:   Last Alcohol Usage/Frequency/Amount/Withdrawal/Hx of Abuse:  2 weeks ago  Foreign travel:   Animal exposure:

## 2022-11-14 NOTE — ED PROVIDER NOTE - ATTENDING CONTRIBUTION TO CARE
Attending MD Jimenez: I personally have seen and examined this patient.  Resident note reviewed and agree on plan of care and except where noted.  See below for details.     seen in [facility] 7    42M with PMH/PSH including migraines presents to the ED with 6 months of headache and back pain.  Reports that he has migraines and that he has had headaches for 6 months, reports presents today because headache is not improving today.  Reports usually takes ASA and Tylenol, last taken 2 days ago.  Reports did not take anything today.  Reports also has back pain, reports pain is diffuse and unchanged in the last 6 months.  Reports that he had a fall two weeks ago, denies change since then.  Denies numbness, weakness or tingling in extremities.  Denies difficulty urinating.  Denies loss of urinary or bowel continence now.   Reports occasional dysuria, denies hematuria.  Reports has intermittent fevers, chills over same 6 months.  Denies chest pain, shortness of breath.  Denies abdominal pain, nausea, vomiting, diarrhea, bloody or black stools.  Reports LE edema, chronic.  Reports lives in [place].  When asked about excoriations on back, reports sometimes it is itchy.      Exam:   General: NAD  HENT: head NCAT, airway patent  Eyes: anicteric  Lungs: lungs CTAB with good inspiratory effort, no wheezing, no rhonchi, no rales  Cardiac: +S1S2, no obvious m/r/g  GI: abdomen soft with +BS, NT, ND  : no CVAT  MSK: FROM at neck, no tenderness to midline palpation, +tenderness to paraspinal area C/T/L, +LE edema  Neuro: moving all extremities spontaneously, sensory grossly intact, no gross neuro deficits  Skin: scattered excoriations to bilateral upper back/shoulder areas    A/P: 42M    TO BE COMPLETED Attending MD Jimenez: I personally have seen and examined this patient.  Resident note reviewed and agree on plan of care and except where noted.  See below for details.     seen in [facility] 7    42M with PMH/PSH including migraines presents to the ED with 6 months of headache and back pain.  Reports that he has migraines and that he has had headaches for 6 months, reports presents today because headache is not improving today.  Reports usually takes ASA and Tylenol, last taken 2 days ago.  Reports did not take anything today.  Reports also has back pain, reports pain is diffuse and unchanged in the last 6 months.  Reports that he had a fall two weeks ago, denies change since then.  Denies numbness, weakness or tingling in extremities.  Denies difficulty urinating.  Denies loss of urinary or bowel continence now.   Reports occasional dysuria, denies hematuria.  Reports has intermittent fevers, chills over same 6 months.  Denies chest pain, shortness of breath.  Denies abdominal pain, nausea, vomiting, diarrhea, bloody or black stools.  Reports LE edema, chronic.  Reports lives in [place].  When asked about excoriations on back, reports sometimes it is itchy.      Exam:   General: NAD  HENT: head NCAT, airway patent  Eyes: anicteric  Lungs: lungs CTAB with good inspiratory effort, no wheezing, no rhonchi, no rales  Cardiac: +S1S2, no obvious m/r/g  GI: abdomen soft with +BS, NT, ND  : no CVAT  MSK: FROM at neck, no tenderness to midline palpation, +tenderness to paraspinal area C/T/L, +LE edema  Neuro: moving all extremities spontaneously, sensory grossly intact, no gross neuro deficits  Skin: scattered excoriations to bilateral upper back/shoulder areas    A/P: 42M    TO BE COMPLETED Attending MD Jimenez: I personally have seen and examined this patient.  Resident note reviewed and agree on plan of care and except where noted.  See below for details.     seen in [facility] 7    42M with PMH/PSH including migraines presents to the ED with 6 months of headache and back pain.  Reports that he has migraines and that he has had headaches for 6 months, reports presents today because headache is not improving today.  Reports usually takes ASA and Tylenol, last taken 2 days ago.  Reports did not take anything today.  Reports also has back pain, reports pain is diffuse and unchanged in the last 6 months.  Reports that he had a fall two weeks ago, denies change since then.  Denies numbness, weakness or tingling in extremities.  Denies difficulty urinating.  Denies loss of urinary or bowel continence now.   Reports occasional dysuria, denies hematuria.  Reports has intermittent fevers, chills over same 6 months.  Denies chest pain, shortness of breath.  Denies abdominal pain, nausea, vomiting, diarrhea, bloody or black stools.  Reports LE edema, chronic.  Reports lives in [place]  When asked about excoriations on back, reports sometimes it is itchy.      Exam:   General: NAD  HENT: head NCAT, airway patent  Eyes: anicteric  Lungs: lungs CTAB with good inspiratory effort, no wheezing, no rhonchi, no rales  Cardiac: +S1S2, no obvious m/r/g  GI: abdomen soft with +BS, NT, ND  : no CVAT  MSK: FROM at neck, no tenderness to midline palpation, +tenderness to paraspinal area C/T/L, +LE edema  Neuro: moving all extremities spontaneously, sensory grossly intact, no gross neuro deficits  Skin: scattered excoriations to bilateral upper back/shoulder areas    A/P: 42M    TO BE COMPLETED Attending MD Jimenez: I personally have seen and examined this patient.  Resident note reviewed and agree on plan of care and except where noted.  See below for details.     seen in [facility] 7    42M with PMH/PSH including migraines presents to the ED with 6 months of headache and back pain.  Reports that he has migraines and that he has had headaches for 6 months, reports presents today because headache is not improving today.  Reports usually takes ASA and Tylenol, last taken 2 days ago.  Reports did not take anything today.  Reports also has back pain, reports pain is diffuse and unchanged in the last 6 months.  Reports that he had a fall two weeks ago, denies change since then.  Denies numbness, weakness or tingling in extremities.  Denies difficulty urinating.  Denies loss of urinary or bowel continence now.   Reports occasional dysuria, denies hematuria.  Reports has intermittent fevers, chills over same 6 months.  Denies chest pain, shortness of breath.  Denies abdominal pain, nausea, vomiting, diarrhea, bloody or black stools.  Reports LE edema, chronic.  Reports lives in [place]  When asked about excoriations on back, reports sometimes it is itchy.      Exam:   General: NAD  HENT: head NCAT, airway patent  Eyes: anicteric  Lungs: lungs CTAB with good inspiratory effort, no wheezing, no rhonchi, no rales  Cardiac: +S1S2, no obvious m/r/g  GI: abdomen soft with +BS, NT, ND  : no CVAT  MSK: FROM at neck, no tenderness to midline palpation, +tenderness to paraspinal area C/T/L, +LE edema  Neuro: moving all extremities spontaneously, sensory grossly intact, no gross neuro deficits  Skin: scattered excoriations to bilateral upper back/shoulder areas    A/P: 42M with headache and back pain, history of migraine, suspect same, given fall two weeks ago will obtain CTH, no red flags, will obtain CXR to eval for other cause of pain such as PNA, will follow up labs, reassess Attending MD Jimenez: I personally have seen and examined this patient.  Resident note reviewed and agree on plan of care and except where noted.  See below for details.     seen in [facility] 7    42M with PMH/PSH including migraines presents to the ED with 6 months of headache and back pain.  Reports that he has migraines and that he has had headaches for 6 months, reports presents today because headache is not improving today.  Reports usually takes ASA and Tylenol, last taken 2 days ago.  Reports did not take anything today.  Reports also has back pain, reports pain is diffuse and unchanged in the last 6 months.  Reports that he had a fall two weeks ago, denies change since then.  Denies numbness, weakness or tingling in extremities.  Denies difficulty urinating.  Denies loss of urinary or bowel continence now.   Reports occasional dysuria, denies hematuria.  Reports has intermittent fevers, chills over same 6 months.  Denies chest pain, shortness of breath.  Denies abdominal pain, nausea, vomiting, diarrhea, bloody or black stools.  Reports LE edema, chronic.  Reports lives in [place]  When asked about excoriations on back, reports sometimes it is itchy.      Exam:   General: NAD  HENT: head NCAT, airway patent  Eyes: anicteric  Lungs: lungs CTAB with good inspiratory effort, no wheezing, no rhonchi, no rales  Cardiac: +S1S2, no obvious m/r/g  GI: abdomen soft with +BS, NT, ND  : no CVAT  MSK: FROM at neck, no tenderness to midline palpation, +tenderness to paraspinal area C/T/L, +LE edema  Neuro: moving all extremities spontaneously, sensory grossly intact, no gross neuro deficits  Skin: scattered excoriations to bilateral upper back/shoulder areas    A/P: 42M with headache and back pain, history of migraine, suspect same, given fall two weeks ago will obtain CTH, no red flags, will obtain CXR to eval for other cause of pain such as PNA, will follow up labs, reassess Attending MD Jimenez: I personally have seen and examined this patient.  Resident note reviewed and agree on plan of care and except where noted.  See below for details.     seen in [facility] 7    42M with PMH/PSH including migraines presents to the ED with 6 months of headache and back pain.  Reports that he has migraines and that he has had headaches for 6 months, reports presents today because headache is not improving today.  Reports usually takes ASA and Tylenol, last taken 2 days ago.  Reports did not take anything today.  Reports also has back pain, reports pain is diffuse and unchanged in the last 6 months.  Reports that he had a fall two weeks ago, denies change since then.  Denies numbness, weakness or tingling in extremities.  Denies difficulty urinating.  Denies loss of urinary or bowel continence now.   Reports occasional dysuria, denies hematuria.  Reports has intermittent fevers, chills over same 6 months.  Denies chest pain, shortness of breath.  Denies abdominal pain, nausea, vomiting, diarrhea, bloody or black stools.  Reports LE edema, chronic.  Reports lives in [place]  When asked about excoriations on back, reports sometimes it is itchy.      Exam:   General: NAD  HENT: head NCAT, airway patent  Eyes: anicteric  Lungs: lungs CTAB with good inspiratory effort, no wheezing, no rhonchi, no rales  Cardiac: +S1S2, no obvious m/r/g  GI: abdomen soft with +BS, NT, ND  : no CVAT  MSK: FROM at neck, no tenderness to midline palpation, +tenderness to paraspinal area C/T/L, +LE edema  Neuro: moving all extremities spontaneously, sensory grossly intact, no gross neuro deficits  Skin: scattered excoriations to bilateral upper back/shoulder areas    A/P: 42M with headache and back pain, history of migraine, suspect same, given fall two weeks ago will obtain CTH, no red flags, will obtain CXR to eval for other cause of pain such as PNA, will follow up labs, reassess

## 2022-11-14 NOTE — H&P ADULT - PROBLEM SELECTOR PLAN 1
- tele  - TSH, cortisol  - occurred while pt sleeping, and pt reports being asymptomatic  - cont to monitor while pt awake during day

## 2022-11-14 NOTE — ED PROVIDER NOTE - NS ED ROS FT
GENERAL: no fever, no chills  EYES: no change in vision  HEENT: no trouble swallowing, no trouble speaking  CARDIAC: no chest pain  PULMONARY: no cough, no shortness of breath  GI: no abdominal pain, no nausea, no vomiting, no diarrhea, no constipation  : no dysuria, no frequency, no change in urine color/appearance, no bladder/bowel incontinence  SKIN: no rashes or lesions  NEURO: + headache, no weakness  MSK: + low back pain, No joint pain or extremity swelling

## 2022-11-14 NOTE — ED ADULT NURSE REASSESSMENT NOTE - NS ED NURSE REASSESS COMMENT FT1
Received call from BRODY, patient rhythm is sinus bradycardia with the lowest HR 33. MD Jimenez and Nuzhat aware. EKG to be performed.

## 2022-11-14 NOTE — ED PROVIDER NOTE - PHYSICAL EXAMINATION
GENERAL: sleeping on gurney, NAD, intermittent cough appreciated  HEAD: NC/AT, neck supple, moist mucous membranes  EYES: pupils 2mm, EOM grossly intact  LUNGS: CTAB, no wheezes or crackles   CARDIAC: Bradycardia, no m/r/g  ABDOMEN: Soft, non tender, non distended, no rebound, no guarding  BACK: TTP in midline T/L spine, no CVA tenderness  EXT: + nonpitting, symmetric LE edema, no calf tenderness, no deformities.  NEURO: A&Ox3. Moving all extremities.  SKIN: Excoriations noted to bilateral shoulders. Skin otherwise warm and dry. No rash.  PSYCH: blunted affect

## 2022-11-14 NOTE — CHART NOTE - NSCHARTNOTEFT_GEN_A_CORE
42M c hx bipolar disorder, migraine, htn, chronic back pain, current smoker, pw headache and back pain and asymptomatic bradycardia and hypoglycemia.      #Bradycardia- sinus salvador   -Now resolving HR 60s  -Hypothermia vs. hypoTSH vs. infection?  -Now resolving after being in ED, pt admits to be on the street exposed to cold but not documented hypothermic here  -Not on BB says he takes an antihypertensive but doesn't know which one and hasn't taken this "in a while"  -Check TSH    #headaches  -pt says he was diagnosed with headaches in the past and has "as needed" medication but doesn't know what  -No neck stiffness but does say it is tender when he touches chin to chest      #Starvation ketosis and hypokalemia  -Replete potassium  -Recheck BMP in am     If bradycardia and headaches do not resolved with rehydration, eating and normothermia would consider neuro eval for LP for viral meningitis vs. lyme. Do not suspect bacterial meningitis at this time given time course and non toxic appearance.    Lola Hansen DO

## 2022-11-14 NOTE — ED PROVIDER NOTE - OBJECTIVE STATEMENT
42Y M PMH migraines, presenting with several months of headache and back pain. 42Y M PMH migraines, presenting with several months of headache and back pain. Pain worsened this evening so presenting to ED for further evaluation. He reports a history of migraines for which he takes aspirin and tylenol, last took tylenol 2 days ago with minimal improvement. He reports falling 2 weeks ago but did not seek medical care at that time. states that he intermittently experiences bladder and bowel incontinence but none recently. No new injury contributing to his chronic back pain. Denies fevers, chills, chest pain, SOB, nausea or vomiting, lower extremity weakness.

## 2022-11-14 NOTE — ED ADULT NURSE REASSESSMENT NOTE - NS ED NURSE REASSESS COMMENT FT1
Patient endorses recent fall x2 weeks ago. Positive headstrike. Patient appears tremulous. Patient denies alcohol use. MD Jimenez aware.

## 2022-11-14 NOTE — H&P ADULT - NSHPPHYSICALEXAM_GEN_ALL_CORE
PHYSICAL EXAM:   GENERAL: Arousable. Drowsy. +confused. No acute distress. Not thin. Not cachectic. Not obese.  HEAD:  Atraumatic. Normocephalic.  EYES: EOMI. PERRLA. Normal conjunctiva/sclera.  ENT: Neck supple. No JVD. Moist oral mucosa. Not edentulous. No thrush.  LYMPH: Normal supraclavicular/cervical lymph nodes.   CARDIAC: +salvador. Regular rhythm. Not irregularly irregular. S1. S2. No murmur. No rub. No distant heart sounds.  LUNG/CHEST: CTAB. BS equal bilaterally. No wheezes. No rales. No rhonchi.  ABDOMEN: Soft. No tenderness. No distension. No fluid wave. Normal bowel sounds.  BACK: No midline/vertebral tenderness. No flank tenderness.  VASCULAR: +2 b/l radial or ulnar pulses. Palpable DP pulses.  EXTREMITIES:  No clubbing. No cyanosis. No edema. Moving all 4.  NEUROLOGY: A&Ox2. Non-focal exam. Cranial nerves intact. Sensation intact.  PSYCH: Inappropriate behavior. Flat affect.  SKIN: No jaundice. No erythema. +excoriations on back and legs  ICU Vital Signs Last 24 Hrs  T(C): 36.6 (14 Nov 2022 03:47), Max: 36.6 (13 Nov 2022 21:30)  T(F): 97.8 (14 Nov 2022 03:47), Max: 97.9 (13 Nov 2022 21:30)  HR: 41 (14 Nov 2022 05:00) (33 - 56)  BP: 104/62 (14 Nov 2022 05:00) (104/62 - 134/88)  BP(mean): 85 (14 Nov 2022 04:25) (77 - 85)  ABP: --  ABP(mean): --  RR: 14 (14 Nov 2022 05:00) (10 - 19)  SpO2: 100% (14 Nov 2022 05:00) (97% - 100%)    O2 Parameters below as of 14 Nov 2022 05:00  Patient On (Oxygen Delivery Method): room air          I&O's Summary

## 2022-11-15 DIAGNOSIS — Z29.9 ENCOUNTER FOR PROPHYLACTIC MEASURES, UNSPECIFIED: ICD-10-CM

## 2022-11-15 DIAGNOSIS — M54.9 DORSALGIA, UNSPECIFIED: ICD-10-CM

## 2022-11-15 LAB
ANION GAP SERPL CALC-SCNC: 9 MMOL/L — SIGNIFICANT CHANGE UP (ref 5–17)
BUN SERPL-MCNC: 9 MG/DL — SIGNIFICANT CHANGE UP (ref 7–23)
CALCIUM SERPL-MCNC: 8.8 MG/DL — SIGNIFICANT CHANGE UP (ref 8.4–10.5)
CHLORIDE SERPL-SCNC: 104 MMOL/L — SIGNIFICANT CHANGE UP (ref 96–108)
CO2 SERPL-SCNC: 24 MMOL/L — SIGNIFICANT CHANGE UP (ref 22–31)
CREAT SERPL-MCNC: 0.71 MG/DL — SIGNIFICANT CHANGE UP (ref 0.5–1.3)
CULTURE RESULTS: SIGNIFICANT CHANGE UP
EGFR: 117 ML/MIN/1.73M2 — SIGNIFICANT CHANGE UP
GLUCOSE BLDC GLUCOMTR-MCNC: 102 MG/DL — HIGH (ref 70–99)
GLUCOSE SERPL-MCNC: 92 MG/DL — SIGNIFICANT CHANGE UP (ref 70–99)
MAGNESIUM SERPL-MCNC: 1.9 MG/DL — SIGNIFICANT CHANGE UP (ref 1.6–2.6)
POTASSIUM SERPL-MCNC: 4.4 MMOL/L — SIGNIFICANT CHANGE UP (ref 3.5–5.3)
POTASSIUM SERPL-SCNC: 4.4 MMOL/L — SIGNIFICANT CHANGE UP (ref 3.5–5.3)
SODIUM SERPL-SCNC: 137 MMOL/L — SIGNIFICANT CHANGE UP (ref 135–145)
SPECIMEN SOURCE: SIGNIFICANT CHANGE UP

## 2022-11-15 PROCEDURE — 72100 X-RAY EXAM L-S SPINE 2/3 VWS: CPT | Mod: 26

## 2022-11-15 PROCEDURE — 99233 SBSQ HOSP IP/OBS HIGH 50: CPT

## 2022-11-15 NOTE — DISCHARGE NOTE PROVIDER - HOSPITAL COURSE
43 y/o Male with PMHx of bipolar disorder, migraine, htn, chronic back pain, current smoker, pw headaches, back pain, bradycardia, hypoglycemia and leukocytosis.     Problem/Plan - 1:  ·  Problem: Bradycardia.   ·  Plan: - asymptomatic  - no events on tele   - TSH 1.84  - cortisol pending   - occurred while pt sleeping, and pt reports being asymptomatic  - no further recs from cardiology.     Problem/Plan - 2:  ·  Problem: Hypoglycemia.   ·  Plan: - TSH 1.84  - cortisol pending   - cont to monitor FSBS qachs  - pt undomiciled ?not eating  - improved.     Problem/Plan - 3:  ·  Problem: Leukocytosis.   ·  Plan: - monitor for fevers  - observe off antibiotics  - resolved.     Problem/Plan - 4:  ·  Problem: Migraine.   ·  Plan: - cont tylenol.     Problem/Plan - 5:  ·  Problem: Bipolar disorder.   ·  Plan: - psych eval   - SW eval   - ?undomiciled.       Problem/Plan - 6:  ·  Problem: Back pain.   ·  Plan: - lumbar spine xray negative for fractures  - tylenol for pain   - no neuro defecits.      Pt is medically cleared for discharge to [facility] via car service.    43 y/o Male with PMHx of bipolar disorder, migraine, htn, chronic back pain, current smoker, pw headaches, back pain, bradycardia, hypoglycemia and leukocytosis.     Problem/Plan - 1:  ·  Problem: Bradycardia.   ·  Plan: - asymptomatic  - no events on tele   - TSH 1.84  - cortisol pending   - occurred while pt sleeping, and pt reports being asymptomatic  - no further recs from cardiology.     Problem/Plan - 2:  ·  Problem: Hypoglycemia.   ·  Plan: - TSH 1.84  - cortisol pending   - cont to monitor FSBS qachs  - pt undomiciled ?not eating  - improved.     Problem/Plan - 3:  ·  Problem: Leukocytosis.   ·  Plan: - monitor for fevers  - observe off antibiotics  - resolved.     Problem/Plan - 4:  ·  Problem: Migraine.   ·  Plan: - cont tylenol.     Problem/Plan - 5:  ·  Problem: Bipolar disorder.   ·  Plan: - psych eval   - SW eval   - ?undomiciled.       Problem/Plan - 6:  ·  Problem: Back pain.   ·  Plan: - lumbar spine xray negative for fractures  - tylenol for pain   - no neuro defecits.      Pt is medically cleared for discharge to [facility] via car service.    41 y/o Male with PMHx of bipolar disorder, migraine, htn, chronic back pain, current smoker, pw headaches, back pain, bradycardia, hypoglycemia and leukocytosis.     Problem/Plan - 1:  ·  Problem: Bradycardia.   ·  Plan: - asymptomatic  - no events on tele   - TSH 1.84  - cortisol pending   - occurred while pt sleeping, and pt reports being asymptomatic  - no further recs from cardiology.     Problem/Plan - 2:  ·  Problem: Hypoglycemia.   ·  Plan: - TSH 1.84  - cortisol pending   - cont to monitor FSBS qachs  - pt undomiciled ?not eating  - improved.     Problem/Plan - 3:  ·  Problem: Leukocytosis.   ·  Plan: - monitor for fevers  - observe off antibiotics  - resolved.     Problem/Plan - 4:  ·  Problem: Migraine.   ·  Plan: - cont tylenol.     Problem/Plan - 5:  ·  Problem: Bipolar disorder.   ·  Plan: - psych eval   - SW eval   - ?undomiciled.       Problem/Plan - 6:  ·  Problem: Back pain.   ·  Plan: - lumbar spine xray negative for fractures  - tylenol for pain   - no neuro defecits.      Pt is medically cleared for discharge to [facility] via car service.    41 y/o Male with PMHx of bipolar disorder, migraine, htn, chronic back pain, current smoker, pw headaches, back pain, bradycardia, hypoglycemia and leukocytosis.     Problem/Plan - 1:  ·  Problem: Bradycardia.   ·  Plan: - asymptomatic  - no events on tele   - TSH 1.84  - cortisol pending   - occurred while pt sleeping, and pt reports being asymptomatic  - no further recs from cardiology.     Problem/Plan - 2:  ·  Problem: Hypoglycemia.   ·  Plan: - TSH 1.84  - cortisol pending   - cont to monitor FSBS qachs  - pt undomiciled ?not eating  - improved.     Problem/Plan - 3:  ·  Problem: Leukocytosis.   ·  Plan: - monitor for fevers  - observe off antibiotics  - resolved.     Problem/Plan - 4:  ·  Problem: Migraine.   ·  Plan: - cont tylenol.     Problem/Plan - 5:  ·  Problem: Bipolar disorder.   ·  Plan: - psych eval   - SW eval   - ?undomiciled.       Problem/Plan - 6:  ·  Problem: Back pain.   ·  Plan: - lumbar spine xray negative for fractures  - tylenol for pain   - no neuro defecits.      [place], will be provided with [facility] for follow-up. Pt is medically cleared for discharge to [facility] via car service.    41 y/o Male with PMHx of bipolar disorder, migraine, htn, chronic back pain, current smoker, pw headaches, back pain, bradycardia, hypoglycemia and leukocytosis.     Problem/Plan - 1:  ·  Problem: Bradycardia.   ·  Plan: - asymptomatic  - no events on tele   - TSH 1.84  - cortisol pending   - occurred while pt sleeping, and pt reports being asymptomatic  - no further recs from cardiology.     Problem/Plan - 2:  ·  Problem: Hypoglycemia.   ·  Plan: - TSH 1.84  - cortisol pending   - cont to monitor FSBS qachs  - pt undomiciled ?not eating  - improved.     Problem/Plan - 3:  ·  Problem: Leukocytosis.   ·  Plan: - monitor for fevers  - observe off antibiotics  - resolved.     Problem/Plan - 4:  ·  Problem: Migraine.   ·  Plan: - cont tylenol.     Problem/Plan - 5:  ·  Problem: Bipolar disorder.   ·  Plan: - psych eval   - SW eval   - ?undomiciled.       Problem/Plan - 6:  ·  Problem: Back pain.   ·  Plan: - lumbar spine xray negative for fractures  - tylenol for pain   - no neuro defecits.      [place], will be provided with [facility] for follow-up. Pt is medically cleared for discharge to [facility] via car service.    43 y/o Male with PMHx of bipolar disorder, migraine, htn, chronic back pain, current smoker, pw headaches, back pain, bradycardia, hypoglycemia and leukocytosis.     Problem/Plan - 1:  ·  Problem: Bradycardia.   ·  Plan: - asymptomatic  - no events on tele   - TSH 1.84  - cortisol pending   - occurred while pt sleeping, and pt reports being asymptomatic  - no further recs from cardiology.     Problem/Plan - 2:  ·  Problem: Hypoglycemia.   ·  Plan: - TSH 1.84  - cortisol pending   - cont to monitor FSBS qachs  - pt undomiciled ?not eating  - improved.     Problem/Plan - 3:  ·  Problem: Leukocytosis.   ·  Plan: - monitor for fevers  - observe off antibiotics  - resolved.     Problem/Plan - 4:  ·  Problem: Migraine.   ·  Plan: - cont tylenol.     Problem/Plan - 5:  ·  Problem: Bipolar disorder.   ·  Plan: - psych eval   - SW eval   - ?undomiciled.       Problem/Plan - 6:  ·  Problem: Back pain.   ·  Plan: - lumbar spine xray negative for fractures  - tylenol for pain   - no neuro defecits.      Since [place] from [place], will be provided with [facility] for follow-up. Pt is medically cleared for discharge to [facility] via car service.

## 2022-11-15 NOTE — CONSULT NOTE ADULT - SUBJECTIVE AND OBJECTIVE BOX
CHIEF COMPLAINT:  Headache, Back Pain    HISTORY OF PRESENT ILLNESS:  42M c hx bipolar disorder, migraine, htn, chronic back pain, current smoker, pw headache and back pain.    Pt sleeping on my arrival at bedside. On calling his name and tapping him, pt would awake briefly, answer appropriately but not reliably with few words, then go back to sleep. Pt unable to remain consistently awake.  pt does not remember the name of any of his doctors, and does not remember the phone number of any relatives. Pt also does not know any of his medications that he takes.  Pt states he lives in [place], but is in [place] visiting to "see sights". Pt states that he's had migraine headache for a very long time, and has chronic back pain, but came to the hospital despite these symptoms not being worse than usual for him. Pt denies fevers, chills, CP.  While in the ED, pt found to have bradycardiac to the 30s and hypoglycemic to 66. Denies IV drug use. Last drink of ETOH was 2 weeks ago.    Cardiology consulted for bradycardia.  Pt denies cardiac history.  Pt denies chest pain, SOB, palpiations.  Denies dizziness, lightheadedness.    PAST MEDICAL & SURGICAL HISTORY:  History of headache    No significant past surgical history    MEDICATIONS:    acetaminophen     Tablet .. 650 milliGRAM(s) Oral every 6 hours PRN    dextrose 50% Injectable 25 Gram(s) IV Push once  dextrose 50% Injectable 12.5 Gram(s) IV Push once  dextrose 50% Injectable 25 Gram(s) IV Push once  dextrose Oral Gel 15 Gram(s) Oral once  glucagon  Injectable 1 milliGRAM(s) IntraMuscular once    influenza   Vaccine 0.5 milliLiter(s) IntraMuscular once  lactated ringers. 1000 milliLiter(s) IV Continuous <Continuous>    FAMILY HISTORY:  No pertinent family history in first degree relatives    SOCIAL HISTORY:    [ ] Non-smoker  [ ] Smoker  [ ] Alcohol    Allergies    No Known Allergies  Intolerances    REVIEW OF SYSTEMS:  CONSTITUTIONAL: No fever, weight loss, + fatigue  EYES: No eye pain, visual disturbances, or discharge  ENMT:  No difficulty hearing, tinnitus, vertigo; No sinus or throat pain  NECK: No pain or stiffness  RESPIRATORY: No cough, wheezing, chills or hemoptysis; No Shortness of Breath  CARDIOVASCULAR: No chest pain, palpitations, passing out, dizziness, or leg swelling  GASTROINTESTINAL: No abdominal or epigastric pain. No nausea, vomiting, or hematemesis; No diarrhea or constipation. No melena or hematochezia.  GENITOURINARY: No dysuria, frequency, hematuria, or incontinence  NEUROLOGICAL: No headaches, memory loss, loss of strength, numbness, or tremors  SKIN: No itching, burning, rashes, or lesions   LYMPH Nodes: No enlarged glands  ENDOCRINE: No heat or cold intolerance; No hair loss  MUSCULOSKELETAL: No joint pain or swelling; No muscle, back, or extremity pain  PSYCHIATRIC: No depression, anxiety, mood swings, or difficulty sleeping  HEME/LYMPH: No easy bruising, or bleeding gums  ALLERY AND IMMUNOLOGIC: No hives or eczema	    [ ] All others negative	  [ ] Unable to obtain    PHYSICAL EXAM:  T(C): 37.1 (11-15-22 @ 04:40), Max: 37.1 (11-14-22 @ 21:00)  HR: 55 (11-15-22 @ 04:40) (50 - 76)  BP: 122/75 (11-15-22 @ 04:40) (110/64 - 134/70)  RR: 18 (11-15-22 @ 04:40) (16 - 18)  SpO2: 98% (11-15-22 @ 04:40) (97% - 98%)  Wt(kg): --  I&O's Summary    Appearance: Normal	  HEENT:   Normal oral mucosa, PERRL, EOMI	  Lymphatic: No lymphadenopathy  Cardiovascular: Normal S1 S2, No JVD, No murmurs, No edema  Respiratory: Lungs clear to auscultation	  Psychiatry: A & O x 3, Mood & affect appropriate  Gastrointestinal:  Soft, Non-tender, + BS	  Skin: No rashes, No ecchymoses, No cyanosis	  Neurologic: Non-focal  Extremities: Normal range of motion, No clubbing, cyanosis or edema  Vascular: Peripheral pulses palpable 2+ bilaterally    TELEMETRY: SB/SR 40-70s	    ECG:  SB - no acute ischemic STT changes	  RADIOLOGY: < from: CT Head No Cont (11.14.22 @ 01:41) >  ACC: [id number] EXAM:  CT BRAIN                          PROCEDURE DATE:  11/14/2022      INTERPRETATION:  CLINICAL INFORMATION:  HA, h/o migraines, fall 2 wks ago    TECHNIQUE: Transverse images obtained from the foramen magnum to the   vertex without the administration of IV contrast material.  Multiplanar   2D reformations obtained from thin slice transverse reconstructions.    COMPARISON:  None available.    FINDINGS:    No acute intracranial abnormalities.    No mass effect or edema.    No evidence of acute cortical infarction or hemorrhage.    No sinusitis or mastoiditis.    No skull fracture.    IMPRESSION:    No acute intracranial abnormalities. All    --- End of Report ---    < end of copied text >  < from: Xray Chest 2 Views PA/Lat (11.14.22 @ 01:14) >  ACC: [id number] EXAM:  XR CHEST PA LAT 2V                          PROCEDURE DATE:  11/14/2022      INTERPRETATION:  EXAMINATION: XR CHEST PA AND LATERAL    CLINICAL INDICATION: back pain    TECHNIQUE: 2 views; Frontal and lateral views of the chest were obtained.    COMPARISON: None.    FINDINGS:  The heart is normal in size.  The lungs show questionable posterior infiltrate not well seen in the   frontal view.  There is no pneumothorax or pleural effusion.  No rib fracture is identified. The visualized upper thoracic vertebra are   normal in height.    IMPRESSION:  Questionable posterior infiltrate. Follow-up study is recommended as   clinically warranted.    --- End of Report ---    < end of copied text >    OTHER: 	  	  LABS:	 	    CARDIAC MARKERS: Troponin T, High Sensitivity Result: 9: Specimen not hemolyzed                     14.8   10.50 )-----------( 448      ( 14 Nov 2022 07:43 )             45.6     11-14    140  |  104  |  13  ----------------------------<  126<H>  3.3<L>   |  24  |  0.76    Ca    8.6      14 Nov 2022 07:43  Phos  2.9     11-14  Mg     1.9     11-14    TPro  6.2  /  Alb  3.6  /  TBili  0.8  /  DBili  x   /  AST  13  /  ALT  13  /  AlkPhos  60  11-14    proBNP: Serum Pro-Brain Natriuretic Peptide: 24 pg/mL (11.13.22 @ 23:58)   Lipid Profile:   HgA1c: A1C with Estimated Average Glucose Result: 5.3: Method: Immunoassay   TSH: Thyroid Stimulating Hormone, Serum: 1.84: Test Repeated uIU/mL (11.14.22 @ 07:43)    CHIEF COMPLAINT:  Headache, Back Pain    HISTORY OF PRESENT ILLNESS:  42M c hx bipolar disorder, migraine, htn, chronic back pain, current smoker, pw headache and back pain.    Pt sleeping on my arrival at bedside. On calling his name and tapping him, pt would awake briefly, answer appropriately but not reliably with few words, then go back to sleep. Pt unable to remain consistently awake.  pt does not remember the name of any of his doctors, and does not remember the phone number of any relatives. Pt also does not know any of his medications that he takes.  Pt states he lives in [place], but is in [place] visiting to "see sights". Pt states that he's had migraine headache for a very long time, and has chronic back pain, but came to the hospital despite these symptoms not being worse than usual for him. Pt denies fevers, chills, CP.  While in the ED, pt found to have bradycardiac to the 30s and hypoglycemic to 66. Denies IV drug use. Last drink of ETOH was 2 weeks ago.    Cardiology consulted for bradycardia.  Pt denies cardiac history.  Pt denies chest pain, SOB, palpiations.  Denies dizziness, lightheadedness.    PAST MEDICAL & SURGICAL HISTORY:  History of headache    No significant past surgical history    MEDICATIONS:    acetaminophen     Tablet .. 650 milliGRAM(s) Oral every 6 hours PRN    dextrose 50% Injectable 25 Gram(s) IV Push once  dextrose 50% Injectable 12.5 Gram(s) IV Push once  dextrose 50% Injectable 25 Gram(s) IV Push once  dextrose Oral Gel 15 Gram(s) Oral once  glucagon  Injectable 1 milliGRAM(s) IntraMuscular once    influenza   Vaccine 0.5 milliLiter(s) IntraMuscular once  lactated ringers. 1000 milliLiter(s) IV Continuous <Continuous>    FAMILY HISTORY:  No pertinent family history in first degree relatives    SOCIAL HISTORY:    [ ] Non-smoker  [ ] Smoker  [ ] Alcohol    Allergies    No Known Allergies  Intolerances    REVIEW OF SYSTEMS:  CONSTITUTIONAL: No fever, weight loss, + fatigue  EYES: No eye pain, visual disturbances, or discharge  ENMT:  No difficulty hearing, tinnitus, vertigo; No sinus or throat pain  NECK: No pain or stiffness  RESPIRATORY: No cough, wheezing, chills or hemoptysis; No Shortness of Breath  CARDIOVASCULAR: No chest pain, palpitations, passing out, dizziness, or leg swelling  GASTROINTESTINAL: No abdominal or epigastric pain. No nausea, vomiting, or hematemesis; No diarrhea or constipation. No melena or hematochezia.  GENITOURINARY: No dysuria, frequency, hematuria, or incontinence  NEUROLOGICAL: No headaches, memory loss, loss of strength, numbness, or tremors  SKIN: No itching, burning, rashes, or lesions   LYMPH Nodes: No enlarged glands  ENDOCRINE: No heat or cold intolerance; No hair loss  MUSCULOSKELETAL: No joint pain or swelling; No muscle, back, or extremity pain  PSYCHIATRIC: No depression, anxiety, mood swings, or difficulty sleeping  HEME/LYMPH: No easy bruising, or bleeding gums  ALLERY AND IMMUNOLOGIC: No hives or eczema	    [ ] All others negative	  [ ] Unable to obtain    PHYSICAL EXAM:  T(C): 37.1 (11-15-22 @ 04:40), Max: 37.1 (11-14-22 @ 21:00)  HR: 55 (11-15-22 @ 04:40) (50 - 76)  BP: 122/75 (11-15-22 @ 04:40) (110/64 - 134/70)  RR: 18 (11-15-22 @ 04:40) (16 - 18)  SpO2: 98% (11-15-22 @ 04:40) (97% - 98%)  Wt(kg): --  I&O's Summary    Appearance: Normal	  HEENT:   Normal oral mucosa, PERRL, EOMI	  Lymphatic: No lymphadenopathy  Cardiovascular: Normal S1 S2, No JVD, No murmurs, No edema  Respiratory: Lungs clear to auscultation	  Psychiatry: A & O x 3, Mood & affect appropriate  Gastrointestinal:  Soft, Non-tender, + BS	  Skin: No rashes, No ecchymoses, No cyanosis	  Neurologic: Non-focal  Extremities: Normal range of motion, No clubbing, cyanosis or edema  Vascular: Peripheral pulses palpable 2+ bilaterally    TELEMETRY: SB/SR 40-70s	    ECG:  SB - no acute ischemic STT changes	  RADIOLOGY: < from: CT Head No Cont (11.14.22 @ 01:41) >  ACC: [id number] EXAM:  CT BRAIN                          PROCEDURE DATE:  11/14/2022      INTERPRETATION:  CLINICAL INFORMATION:  HA, h/o migraines, fall 2 wks ago    TECHNIQUE: Transverse images obtained from the foramen magnum to the   vertex without the administration of IV contrast material.  Multiplanar   2D reformations obtained from thin slice transverse reconstructions.    COMPARISON:  None available.    FINDINGS:    No acute intracranial abnormalities.    No mass effect or edema.    No evidence of acute cortical infarction or hemorrhage.    No sinusitis or mastoiditis.    No skull fracture.    IMPRESSION:    No acute intracranial abnormalities. All    --- End of Report ---    < end of copied text >  < from: Xray Chest 2 Views PA/Lat (11.14.22 @ 01:14) >  ACC: [id number] EXAM:  XR CHEST PA LAT 2V                          PROCEDURE DATE:  11/14/2022      INTERPRETATION:  EXAMINATION: XR CHEST PA AND LATERAL    CLINICAL INDICATION: back pain    TECHNIQUE: 2 views; Frontal and lateral views of the chest were obtained.    COMPARISON: None.    FINDINGS:  The heart is normal in size.  The lungs show questionable posterior infiltrate not well seen in the   frontal view.  There is no pneumothorax or pleural effusion.  No rib fracture is identified. The visualized upper thoracic vertebra are   normal in height.    IMPRESSION:  Questionable posterior infiltrate. Follow-up study is recommended as   clinically warranted.    --- End of Report ---    < end of copied text >    OTHER: 	  	  LABS:	 	    CARDIAC MARKERS: Troponin T, High Sensitivity Result: 9: Specimen not hemolyzed                     14.8   10.50 )-----------( 448      ( 14 Nov 2022 07:43 )             45.6     11-14    140  |  104  |  13  ----------------------------<  126<H>  3.3<L>   |  24  |  0.76    Ca    8.6      14 Nov 2022 07:43  Phos  2.9     11-14  Mg     1.9     11-14    TPro  6.2  /  Alb  3.6  /  TBili  0.8  /  DBili  x   /  AST  13  /  ALT  13  /  AlkPhos  60  11-14    proBNP: Serum Pro-Brain Natriuretic Peptide: 24 pg/mL (11.13.22 @ 23:58)   Lipid Profile:   HgA1c: A1C with Estimated Average Glucose Result: 5.3: Method: Immunoassay   TSH: Thyroid Stimulating Hormone, Serum: 1.84: Test Repeated uIU/mL (11.14.22 @ 07:43)    CHIEF COMPLAINT:  Headache, Back Pain    HISTORY OF PRESENT ILLNESS:  42M c hx bipolar disorder, migraine, htn, chronic back pain, current smoker, pw headache and back pain.    Pt sleeping on my arrival at bedside. On calling his name and tapping him, pt would awake briefly, answer appropriately but not reliably with few words, then go back to sleep. Pt unable to remain consistently awake.  pt does not remember the name of any of his doctors, and does not remember the phone number of any relatives. Pt also does not know any of his medications that he takes.  Pt states he lives in [place], but is in [place] visiting to "see sights". Pt states that he's had migraine headache for a very long time, and has chronic back pain, but came to the hospital despite these symptoms not being worse than usual for him. Pt denies fevers, chills, CP.  While in the ED, pt found to have bradycardiac to the 30s and hypoglycemic to 66. Denies IV drug use. Last drink of ETOH was 2 weeks ago.    Cardiology consulted for bradycardia.  Pt denies cardiac history.  Pt denies chest pain, SOB, palpiations.  Denies dizziness, lightheadedness.    PAST MEDICAL & SURGICAL HISTORY:  History of headache    No significant past surgical history    MEDICATIONS:    acetaminophen     Tablet .. 650 milliGRAM(s) Oral every 6 hours PRN    dextrose 50% Injectable 25 Gram(s) IV Push once  dextrose 50% Injectable 12.5 Gram(s) IV Push once  dextrose 50% Injectable 25 Gram(s) IV Push once  dextrose Oral Gel 15 Gram(s) Oral once  glucagon  Injectable 1 milliGRAM(s) IntraMuscular once    influenza   Vaccine 0.5 milliLiter(s) IntraMuscular once  lactated ringers. 1000 milliLiter(s) IV Continuous <Continuous>    FAMILY HISTORY:  No pertinent family history in first degree relatives    SOCIAL HISTORY:    [ ] Non-smoker  [ ] Smoker  [ ] Alcohol    Allergies    No Known Allergies  Intolerances    REVIEW OF SYSTEMS:  CONSTITUTIONAL: No fever, weight loss, + fatigue  EYES: No eye pain, visual disturbances, or discharge  ENMT:  No difficulty hearing, tinnitus, vertigo; No sinus or throat pain  NECK: No pain or stiffness  RESPIRATORY: No cough, wheezing, chills or hemoptysis; No Shortness of Breath  CARDIOVASCULAR: No chest pain, palpitations, passing out, dizziness, or leg swelling  GASTROINTESTINAL: No abdominal or epigastric pain. No nausea, vomiting, or hematemesis; No diarrhea or constipation. No melena or hematochezia.  GENITOURINARY: No dysuria, frequency, hematuria, or incontinence  NEUROLOGICAL: No headaches, memory loss, loss of strength, numbness, or tremors  SKIN: No itching, burning, rashes, or lesions   LYMPH Nodes: No enlarged glands  ENDOCRINE: No heat or cold intolerance; No hair loss  MUSCULOSKELETAL: No joint pain or swelling; No muscle, back, or extremity pain  PSYCHIATRIC: No depression, anxiety, mood swings, or difficulty sleeping  HEME/LYMPH: No easy bruising, or bleeding gums  ALLERY AND IMMUNOLOGIC: No hives or eczema	    [ ] All others negative	  [ ] Unable to obtain    PHYSICAL EXAM:  T(C): 37.1 (11-15-22 @ 04:40), Max: 37.1 (11-14-22 @ 21:00)  HR: 55 (11-15-22 @ 04:40) (50 - 76)  BP: 122/75 (11-15-22 @ 04:40) (110/64 - 134/70)  RR: 18 (11-15-22 @ 04:40) (16 - 18)  SpO2: 98% (11-15-22 @ 04:40) (97% - 98%)  Wt(kg): --  I&O's Summary    Appearance: Normal	  HEENT:   Normal oral mucosa, PERRL, EOMI	  Lymphatic: No lymphadenopathy  Cardiovascular: Normal S1 S2, No JVD, No murmurs, No edema  Respiratory: Lungs clear to auscultation	  Psychiatry: A & O x 3, Mood & affect appropriate  Gastrointestinal:  Soft, Non-tender, + BS	  Skin: No rashes, No ecchymoses, No cyanosis	  Neurologic: Non-focal  Extremities: Normal range of motion, No clubbing, cyanosis or edema  Vascular: Peripheral pulses palpable 2+ bilaterally    TELEMETRY: SB/SR 40-70s	    ECG:  SB - no acute ischemic STT changes	  RADIOLOGY: < from: CT Head No Cont (11.14.22 @ 01:41) >  ACC: [id number] EXAM:  CT BRAIN                          PROCEDURE DATE:  11/14/2022      INTERPRETATION:  CLINICAL INFORMATION:  HA, h/o migraines, fall 2 wks ago    TECHNIQUE: Transverse images obtained from the foramen magnum to the   vertex without the administration of IV contrast material.  Multiplanar   2D reformations obtained from thin slice transverse reconstructions.    COMPARISON:  None available.    FINDINGS:    No acute intracranial abnormalities.    No mass effect or edema.    No evidence of acute cortical infarction or hemorrhage.    No sinusitis or mastoiditis.    No skull fracture.    IMPRESSION:    No acute intracranial abnormalities. All    --- End of Report ---    < end of copied text >  < from: Xray Chest 2 Views PA/Lat (11.14.22 @ 01:14) >  ACC: [id number] EXAM:  XR CHEST PA LAT 2V                          PROCEDURE DATE:  11/14/2022      INTERPRETATION:  EXAMINATION: XR CHEST PA AND LATERAL    CLINICAL INDICATION: back pain    TECHNIQUE: 2 views; Frontal and lateral views of the chest were obtained.    COMPARISON: None.    FINDINGS:  The heart is normal in size.  The lungs show questionable posterior infiltrate not well seen in the   frontal view.  There is no pneumothorax or pleural effusion.  No rib fracture is identified. The visualized upper thoracic vertebra are   normal in height.    IMPRESSION:  Questionable posterior infiltrate. Follow-up study is recommended as   clinically warranted.    --- End of Report ---    < end of copied text >    OTHER: 	  	  LABS:	 	    CARDIAC MARKERS: Troponin T, High Sensitivity Result: 9: Specimen not hemolyzed                     14.8   10.50 )-----------( 448      ( 14 Nov 2022 07:43 )             45.6     11-14    140  |  104  |  13  ----------------------------<  126<H>  3.3<L>   |  24  |  0.76    Ca    8.6      14 Nov 2022 07:43  Phos  2.9     11-14  Mg     1.9     11-14    TPro  6.2  /  Alb  3.6  /  TBili  0.8  /  DBili  x   /  AST  13  /  ALT  13  /  AlkPhos  60  11-14    proBNP: Serum Pro-Brain Natriuretic Peptide: 24 pg/mL (11.13.22 @ 23:58)   Lipid Profile:   HgA1c: A1C with Estimated Average Glucose Result: 5.3: Method: Immunoassay   TSH: Thyroid Stimulating Hormone, Serum: 1.84: Test Repeated uIU/mL (11.14.22 @ 07:43)

## 2022-11-15 NOTE — DISCHARGE NOTE PROVIDER - NSFOLLOWUPCLINICS_GEN_ALL_ED_FT
[facility] - Primary Care  Primary Care  [address] [place] [address]  Phone: [phone]  Fax:      [facility] - Primary Care  Primary Care  [address] [place] [address]  Phone: [phone]  Fax:      [facility] - Primary Care  Primary Care  [address] [place] [address]  Phone: [phone]  Fax:      [facility] - Primary Care  Primary Care  [address] [place] [address]  Phone: [phone]  Fax:   Follow Up Time: 1 week     [facility] - Primary Care  Primary Care  [address] [place] [address]  Phone: [phone]  Fax:   Follow Up Time: 1 week     [facility] - Primary Care  [address]  [address] [place] [address]  Phone: [phone]  Fax:   Follow Up Time: 1 week

## 2022-11-15 NOTE — DISCHARGE NOTE PROVIDER - NSDCFUADDAPPT_GEN_ALL_CORE_FT
APPTS ARE READY TO BE MADE: [ x] YES    Best Family or Patient Contact (if needed):    Additional Information about above appointments (if needed):    1: Primary care in 1 week  2:   3:     Other comments or requests:    APPTS ARE READY TO BE MADE: [ x] YES    Best Family or Patient Contact (if needed):    Additional Information about above appointments (if needed):    1: Primary care in 1 week  2:   3:     Other comments or requests:   Patient was outreached at the phone number [phone], phone number does not correspond to the patient listed/ the phone number is not in service.  APPTS ARE READY TO BE MADE: [ x] YES    Best Family or Patient Contact (if needed):    Additional Information about above appointments (if needed):    1: Primary care in 1 week  2:   3:     Other comments or requests:   Patient was outreached at the phone number [phone], phone number does not correspond to the patient listed/ the phone number is not in service.  APPTS ARE READY TO BE MADE: [ x] YES    Best Family or Patient Contact (if needed):    Additional Information about above appointments (if needed):    1: Primary care in 1 week  2:   3:     Other comments or requests:   Patient was outreached at the phone number [phone], phone number does not correspond to the patient listed/ the phone number is not in service.

## 2022-11-15 NOTE — DISCHARGE NOTE PROVIDER - NSDCCPCAREPLAN_GEN_ALL_CORE_FT
PRINCIPAL DISCHARGE DIAGNOSIS  Diagnosis: Sinus bradycardia  Assessment and Plan of Treatment:   HOME CARE INSTRUCTIONS  Continue physical activities as directed.  Do not smoke.  Avoid drinking alcohol.   Only take over-the-counter or prescription medicine for pain, discomfort, or fever as directed by your caregiver.  Follow your caregiver's suggestions for further testing if your chest pain does not go away.  Keep any follow-up appointments you made. If you do not go to an appointment, you could develop lasting (chronic) problems with pain. If there is any problem keeping an appointment, you must call to reschedule.   SEEK MEDICAL CARE IF:  You think you are having problems from the medicine you are taking. Read your medicine instructions carefully.  Your chest pain does not go away, even after treatment.  You develop a rash with blisters on your chest.  SEEK IMMEDIATE MEDICAL CARE IF:  You have increased chest pain or pain that spreads to your arm, neck, jaw, back, or abdomen.   You develop shortness of breath, an increasing cough, or you are coughing up blood.  You have severe back or abdominal pain, feel nauseous, or vomit.  You develop severe weakness, fainting, or chills.  You have a fever.        SECONDARY DISCHARGE DIAGNOSES  Diagnosis: Bipolar disorder  Assessment and Plan of Treatment: Follow up with psychiatry for managment    Diagnosis: Hypoglycemia  Assessment and Plan of Treatment: Resolved    Diagnosis: Headache, tension-type  Assessment and Plan of Treatment: Resolved    Diagnosis: Living in homeless shelter  Assessment and Plan of Treatment: Social work provided list of shelters     PRINCIPAL DISCHARGE DIAGNOSIS  Diagnosis: Sinus bradycardia  Assessment and Plan of Treatment:   HOME CARE INSTRUCTIONS  Continue physical activities as directed.  Do not smoke.  Avoid drinking alcohol.   Only take over-the-counter or prescription medicine for pain, discomfort, or fever as directed by your caregiver.  Follow your caregiver's suggestions for further testing if your chest pain does not go away.  Keep any follow-up appointments you made. If you do not go to an appointment, you could develop lasting (chronic) problems with pain. If there is any problem keeping an appointment, you must call to reschedule.   SEEK MEDICAL CARE IF:  You think you are having problems from the medicine you are taking. Read your medicine instructions carefully.  Your chest pain does not go away, even after treatment.  You develop a rash with blisters on your chest.  SEEK IMMEDIATE MEDICAL CARE IF:  You have increased chest pain or pain that spreads to your arm, neck, jaw, back, or abdomen.   You develop shortness of breath, an increasing cough, or you are coughing up blood.  You have severe back or abdominal pain, feel nauseous, or vomit.  You develop severe weakness, fainting, or chills.  You have a fever.        SECONDARY DISCHARGE DIAGNOSES  Diagnosis: Hypoglycemia  Assessment and Plan of Treatment: Resolved    Diagnosis: Bipolar disorder  Assessment and Plan of Treatment: Follow up with psychiatry for managment    Diagnosis: Headache, tension-type  Assessment and Plan of Treatment: Resolved    Diagnosis: Back pain  Assessment and Plan of Treatment: Your x-ray was negative for fracture. Take Tylenol as needed for pain.    Diagnosis: Living in homeless shelter  Assessment and Plan of Treatment: Social work provided list of shelters

## 2022-11-15 NOTE — CONSULT NOTE ADULT - PROBLEM SELECTOR RECOMMENDATION 2
low 38 on EKG in ED   remains bradycardiac on tele while inpt    - low 41 as per tele report  pt is asymptomatic  avoid AV aline blockers  atropine bedside for persistent symptomatic bradycardia <30bpms  continue to monitor on tele

## 2022-11-15 NOTE — DISCHARGE NOTE PROVIDER - NSFOLLOWUPCLINICSTOKEN_GEN_ALL_ED_FT
[id number]: || ||00\01||False; [id number]: || ||00\01||False; [id number]: || ||00\01||False; 431113:1 week|| ||00\01||False; 055020:1 week|| ||00\01||False; 442345:1 week|| ||00\01||False;

## 2022-11-15 NOTE — CONSULT NOTE ADULT - ASSESSMENT
42M c hx bipolar disorder, migraine, htn, chronic back pain, current smoker, pw headache and back pain.

## 2022-11-16 VITALS
DIASTOLIC BLOOD PRESSURE: 65 MMHG | RESPIRATION RATE: 18 BRPM | HEART RATE: 71 BPM | OXYGEN SATURATION: 97 % | SYSTOLIC BLOOD PRESSURE: 117 MMHG | TEMPERATURE: 98 F

## 2022-11-16 PROCEDURE — 99222 1ST HOSP IP/OBS MODERATE 55: CPT

## 2022-11-16 PROCEDURE — 99239 HOSP IP/OBS DSCHRG MGMT >30: CPT | Mod: GC

## 2022-11-16 NOTE — BH CONSULTATION LIAISON ASSESSMENT NOTE - HPI (INCLUDE ILLNESS QUALITY, SEVERITY, DURATION, TIMING, CONTEXT, MODIFYING FACTORS, ASSOCIATED SIGNS AND SYMPTOMS)
41 y/o male, AA, undomiciled, was living in [place], now in [place] homeless, hx bipolar disorder, had prior psych admissions, last admission 6 months ago? in [place], for unclear reasons, not in current psych tx, hx migraine, htn, chronic back pain, current smoker, admitted for  bradycardia, hypoglycemia, leukocytosis, psych consulted for med mgt, psych clearance. Pt poor historian, lying in bed. Pt states his mood has been stable, denies depression, shane. pt denies changes in sleep, appetite. no si/hi/i/p. denies anxiety, psychosis. pt denies hx suicide attempts drug use. pt unable to explain details of admission, or when he came to [place]. Pt denies having family involved in his care. pt currently not working. pt used to take psych meds months ago, doesn't recall the names. pt doesn't want to take psych meds at this time.  no numbers to call for collateral at this time.      43 y/o male, AA, undomiciled, was living in [place], now in [place] homeless, hx bipolar disorder, had prior psych admissions, last admission 6 months ago? in [place], for unclear reasons, not in current psych tx, hx migraine, htn, chronic back pain, current smoker, admitted for  bradycardia, hypoglycemia, leukocytosis, psych consulted for med mgt, psych clearance. Pt poor historian, lying in bed. Pt states his mood has been stable, denies depression, shane. pt denies changes in sleep, appetite. no si/hi/i/p. denies anxiety, psychosis. pt denies hx suicide attempts drug use. pt unable to explain details of admission, or when he came to [place]. Pt denies having family involved in his care. pt currently not working. pt used to take psych meds months ago, doesn't recall the names. pt doesn't want to take psych meds at this time.  no numbers to call for collateral at this time.

## 2022-11-16 NOTE — DISCHARGE NOTE NURSING/CASE MANAGEMENT/SOCIAL WORK - NSDCPEFALRISK_GEN_ALL_CORE
For information on Fall & Injury Prevention, visit: [email or web address]-[email or web address]-mobility OR  [email or web address]/[email or web address]-injury OR  https://www.cdc.gov/steadi/patient.html For information on Fall & Injury Prevention, visit: https://www.[facility]/news/fall-prevention-protects-[email or web address]-maintains-health-[email or web address]-mobility OR  https://www.[facility]/news/fall-prevention-tips-to-avoid-injury OR  https://www.cdc.gov/steadi/patient.html For information on Fall & Injury Prevention, visit: https://www.[facility]/news/fall-prevention-protects-and-maintains-health-and-mobility OR  https://www.[facility]/news/fall-prevention-tips-to-avoid-injury OR  https://www.cdc.gov/steadi/patient.html

## 2022-11-16 NOTE — BH CONSULTATION LIAISON ASSESSMENT NOTE - NSELEVCHRSUICRISK_PSY_ALL_CORE
Patient's spouse called and left a  requesting a refill of:  albuterol 108 (90 BASE) MCG/ACT inhaler 1 Inhaler 0 2/16/2017     Sig - Route: Inhale 2 puffs into the lungs every 4 hours as needed for Other       Last office visit 3/8/21, follow up 3/10/22.  Last prescribed in 2017 when patient was in urgent care.  Call placed to [place] and left message to return call.  Is patient has breathing issues?  Also need pharmacy.   No

## 2022-11-16 NOTE — BH CONSULTATION LIAISON ASSESSMENT NOTE - SUMMARY
41 y/o male, AA, undomiciled, was living in [place], now in [place] homeless, hx bipolar disorder, had prior psych admissions, last admission 6 months ago? in [place], for unclear reasons, not in current psych tx, hx migraine, htn, chronic back pain, current smoker, admitted for  bradycardia, hypoglycemia, leukocytosis, psych consulted for med mgt, psych clearance. Pt poor historian, lying in bed. Pt states his mood has been stable, denies depression, shane. pt denies changes in sleep, appetite. no si/hi/i/p. denies anxiety, psychosis.  43 y/o male, AA, undomiciled, was living in [place], now in [place] homeless, hx bipolar disorder, had prior psych admissions, last admission 6 months ago? in [place], for unclear reasons, not in current psych tx, hx migraine, htn, chronic back pain, current smoker, admitted for  bradycardia, hypoglycemia, leukocytosis, psych consulted for med mgt, psych clearance. Pt poor historian, lying in bed. Pt states his mood has been stable, denies depression, shane. pt denies changes in sleep, appetite. no si/hi/i/p. denies anxiety, psychosis.

## 2022-11-16 NOTE — BH CONSULTATION LIAISON ASSESSMENT NOTE - NSBHCONSULTRECOMMENDOTHER_PSY_A_CORE FT
pt doesn't know meds he was last taking, doesn't want to take standing psych meds at this time. no psych admission needed at this time.

## 2022-11-16 NOTE — PROGRESS NOTE ADULT - PROBLEM SELECTOR PLAN 7
RODOLFO to facilitate dispo  Medically cleared for discharge [facility] to facilitate dispo  Medically cleared for discharge    DC to shelter

## 2022-11-16 NOTE — BH CONSULTATION LIAISON ASSESSMENT NOTE - NSBHCHARTREVIEWVS_PSY_A_CORE FT
Vital Signs Last 24 Hrs  T(C): 36.7 (16 Nov 2022 11:45), Max: 36.8 (15 Nov 2022 20:22)  T(F): 98.1 (16 Nov 2022 11:45), Max: 98.3 (15 Nov 2022 20:22)  HR: 71 (16 Nov 2022 11:45) (69 - 71)  BP: 117/65 (16 Nov 2022 11:45) (112/60 - 128/72)  BP(mean): --  RR: 18 (16 Nov 2022 11:45) (18 - 18)  SpO2: 97% (16 Nov 2022 11:45) (95% - 98%)    Parameters below as of 16 Nov 2022 11:45  Patient On (Oxygen Delivery Method): room air

## 2022-11-16 NOTE — DISCHARGE NOTE NURSING/CASE MANAGEMENT/SOCIAL WORK - PATIENT PORTAL LINK FT
You can access the FollowMyHealth Patient Portal offered by [facility] by registering at the following website: [email or web address]. By joining [facility]’s FollowMyHealth portal, you will also be able to view your health information using other applications (apps) compatible with our system. You can access the FollowMyHealth Patient Portal offered by [facility] by registering at the following website: [email or web address]. By joining [employer]’s FollowMyHealth portal, you will also be able to view your health information using other applications (apps) compatible with our system. You can access the FollowMyHealth Patient Portal offered by [facility] by registering at the following website: [email or web address]. By joining [employer]’s FollowMyHealth portal, you will also be able to view your health information using other applications (apps) compatible with our system.

## 2022-11-16 NOTE — BH CONSULTATION LIAISON ASSESSMENT NOTE - NSBHCONSULTFOLLOWAFTERCARE_PSY_A_CORE FT
can f/u [facility]  can f/u [facility]  can f/u [facility]

## 2022-11-16 NOTE — PROGRESS NOTE ADULT - PROBLEM SELECTOR PLAN 5
- psych eval   - SW eval   - ?undomiciled - psych eval - cleared  - SW eval - going to shelter at [place]  - undomiciled - psych eval - cleared  - SW eval - going to shelter at [place]  - undomiciled - psych eval - cleared  - SW eval - going to shelter at [place]  - undomiciled

## 2022-11-16 NOTE — PROGRESS NOTE ADULT - PROBLEM SELECTOR PLAN 3
- monitor for fevers  - observe off antibiotics  - resolved
- monitor for fevers  - observe off antibiotics  - resolved

## 2022-11-16 NOTE — PROGRESS NOTE ADULT - ASSESSMENT
42M c hx bipolar disorder, migraine, htn, chronic back pain, current smoker, pw headache and back pain.
42M c hx bipolar disorder, migraine, htn, chronic back pain, current smoker, pw bradycardia, hypoglycemia, leukocytosis
42M c hx bipolar disorder, migraine, htn, chronic back pain, current smoker, pw bradycardia, hypoglycemia, leukocytosis

## 2022-11-16 NOTE — PROGRESS NOTE ADULT - TIME BILLING
Advanced care planning was discussed with patient and family.  Advanced care planning forms were reviewed and discussed as appropriate.  Differential diagnosis and plan of care discussed with patient after the evaluation.   Pain assessed and judicious use of narcotics when appropriate was discussed.  Importance of Fall prevention discussed.  Counseling on Smoking and Alcohol cessation was offered when appropriate.  Counseling on Diet, exercise, and medication compliance was done.
DC time 50 min

## 2022-11-16 NOTE — PROGRESS NOTE ADULT - PROBLEM SELECTOR PLAN 1
pain control  management as per primary team
- asymptomatic  - no events on tele   - TSH 1.84  - cortisol pending   - occurred while pt sleeping, and pt reports being asymptomatic  - no further recs from cardiology
- asymptomatic  - no events on tele   - TSH 1.84  - cortisol pending   - occurred while pt sleeping, and pt reports being asymptomatic  - no further recs from cardiology

## 2022-11-16 NOTE — BH CONSULTATION LIAISON ASSESSMENT NOTE - CURRENT MEDICATION
MEDICATIONS  (STANDING):  dextrose 50% Injectable 25 Gram(s) IV Push once  dextrose 50% Injectable 12.5 Gram(s) IV Push once  dextrose 50% Injectable 25 Gram(s) IV Push once  dextrose Oral Gel 15 Gram(s) Oral once  glucagon  Injectable 1 milliGRAM(s) IntraMuscular once  influenza   Vaccine 0.5 milliLiter(s) IntraMuscular once  lactated ringers. 1000 milliLiter(s) (200 mL/Hr) IV Continuous <Continuous>    MEDICATIONS  (PRN):  acetaminophen     Tablet .. 650 milliGRAM(s) Oral every 6 hours PRN Temp greater or equal to 38C (100.4F), Mild Pain (1 - 3)

## 2022-11-16 NOTE — DISCHARGE NOTE NURSING/CASE MANAGEMENT/SOCIAL WORK - NSFLUVACAGEDISCH_IMM_ALL_CORE
Outreach attempt was made to schedule a Medicare Wellness Visit. This was the second attempt. Contact was not made, left message.  
Adult

## 2022-11-16 NOTE — DISCHARGE NOTE NURSING/CASE MANAGEMENT/SOCIAL WORK - NSDCFUADDAPPT_GEN_ALL_CORE_FT
APPTS ARE READY TO BE MADE: [ x] YES    Best Family or Patient Contact (if needed):    Additional Information about above appointments (if needed):    1: Primary care in 1 week  2:   3:     Other comments or requests:

## 2022-11-16 NOTE — PROGRESS NOTE ADULT - PROBLEM SELECTOR PLAN 6
- lumbar spine xray negative for fractures  - tylenol for pain   - no neuro defecits
- check lumbar spine xray  - tylenol for pain   - no neuro defecits

## 2022-11-16 NOTE — BH CONSULTATION LIAISON ASSESSMENT NOTE - NSBHMSEMOOD_PSY_A_CORE
Has Your Skin Lesion Been Treated?: not been treated Is This A New Presentation, Or A Follow-Up?: Skin Lesion Additional History: Patient states that the lesion has been the same for years but has become more raised. Normal

## 2022-11-16 NOTE — PROGRESS NOTE ADULT - PROBLEM SELECTOR PLAN 2
- TSH 1.84  - cortisol pending   - cont to monitor FSBS qachs  - pt undomiciled ?not eating  - improved
low 38 on EKG in ED   remains bradycardic on tele while inpt  pt is asymptomatic  avoid AV aline blockers  atropine bedside for persistent symptomatic bradycardia <30bpms  continue to monitor on tele
- TSH 1.84  - cortisol pending   - cont to monitor FSBS qachs  - proinsulin pending   - pt undomiciled ?not eating

## 2022-11-16 NOTE — CHART NOTE - NSCHARTNOTEFT_GEN_A_CORE
1 attempt was made to reach patient, which have been unsuccessful. Unable to leave voicemail on 11/16/22

## 2022-11-16 NOTE — PROGRESS NOTE ADULT - SUBJECTIVE AND OBJECTIVE BOX
Patient is a 42y old  Male who presents with a chief complaint of headache, back pain (16 Nov 2022 10:11)      SUBJECTIVE / OVERNIGHT EVENTS: sinus 40-80s on tele , back pain is better, no cp, sob, abd pain, chills     MEDICATIONS  (STANDING):  dextrose 50% Injectable 25 Gram(s) IV Push once  dextrose 50% Injectable 12.5 Gram(s) IV Push once  dextrose 50% Injectable 25 Gram(s) IV Push once  dextrose Oral Gel 15 Gram(s) Oral once  glucagon  Injectable 1 milliGRAM(s) IntraMuscular once  influenza   Vaccine 0.5 milliLiter(s) IntraMuscular once  lactated ringers. 1000 milliLiter(s) (200 mL/Hr) IV Continuous <Continuous>    MEDICATIONS  (PRN):  acetaminophen     Tablet .. 650 milliGRAM(s) Oral every 6 hours PRN Temp greater or equal to 38C (100.4F), Mild Pain (1 - 3)        CAPILLARY BLOOD GLUCOSE      POCT Blood Glucose.: 102 mg/dL (15 Nov 2022 21:42)    I&O's Summary    15 Nov 2022 07:01  -  16 Nov 2022 07:00  --------------------------------------------------------  IN: 550 mL / OUT: 0 mL / NET: 550 mL        PHYSICAL EXAM:  GENERAL: NAD, well-developed  HEAD:  Atraumatic, Normocephalic  EYES: conjunctiva and sclera clear  NECK:  No JVD  CHEST/LUNG: Clear to auscultation bilaterally; No wheeze  HEART: Regular rate and rhythm; No murmurs, rubs, or gallops  ABDOMEN: Soft, Nontender, Nondistended; Bowel sounds present  EXTREMITIES:  2+ Peripheral Pulses, No clubbing, cyanosis, or edema  PSYCH: AAOx3  NEUROLOGY: non-focal  SKIN: No rashes or lesions    LABS:    11-15    137  |  104  |  9   ----------------------------<  92  4.4   |  24  |  0.71    Ca    8.8      15 Nov 2022 13:38  Mg     1.9     11-15                RADIOLOGY & ADDITIONAL TESTS:    Imaging Personally Reviewed:    Consultant(s) Notes Reviewed:      Care Discussed with Consultants/Other Providers:  
Subjective: Patient seen and examined. No new events except as noted.     REVIEW OF SYSTEMS:    CONSTITUTIONAL: + weakness, fevers or chills  EYES/ENT: No visual changes;  No vertigo or throat pain   NECK: No pain or stiffness  RESPIRATORY: No cough, wheezing, hemoptysis; No shortness of breath  CARDIOVASCULAR: No chest pain or palpitations  GASTROINTESTINAL: No abdominal or epigastric pain. No nausea, vomiting, or hematemesis; No diarrhea or constipation. No melena or hematochezia.  GENITOURINARY: No dysuria, frequency or hematuria  NEUROLOGICAL: No numbness or weakness  SKIN: No itching, burning, rashes, or lesions   All other review of systems is negative unless indicated above.    MEDICATIONS:  MEDICATIONS  (STANDING):  dextrose 50% Injectable 25 Gram(s) IV Push once  dextrose 50% Injectable 12.5 Gram(s) IV Push once  dextrose 50% Injectable 25 Gram(s) IV Push once  dextrose Oral Gel 15 Gram(s) Oral once  glucagon  Injectable 1 milliGRAM(s) IntraMuscular once  influenza   Vaccine 0.5 milliLiter(s) IntraMuscular once  lactated ringers. 1000 milliLiter(s) (200 mL/Hr) IV Continuous <Continuous>    PHYSICAL EXAM:  T(C): 36.7 (11-16-22 @ 04:05), Max: 36.8 (11-15-22 @ 20:22)  HR: 69 (11-16-22 @ 04:05) (69 - 70)  BP: 112/60 (11-16-22 @ 04:05) (112/60 - 128/72)  RR: 18 (11-16-22 @ 04:05) (18 - 18)  SpO2: 98% (11-16-22 @ 04:05) (95% - 98%)  Wt(kg): --  I&O's Summary    15 Nov 2022 07:01  -  16 Nov 2022 07:00  --------------------------------------------------------  IN: 550 mL / OUT: 0 mL / NET: 550 mL    Appearance: Normal	  HEENT:   Normal oral mucosa, PERRL, EOMI	  Lymphatic: No lymphadenopathy , no edema  Cardiovascular: Normal S1 S2, No JVD, No murmurs , Peripheral pulses palpable 2+ bilaterally  Respiratory: Lungs clear to auscultation, normal effort 	  Gastrointestinal:  Soft, Non-tender, + BS	  Skin: No rashes, No ecchymoses, No cyanosis, warm to touch  Musculoskeletal: Normal range of motion, normal strength  Psychiatry:  Mood & affect appropriate  Ext: No edema    LABS:    CARDIAC MARKERS:  CARDIAC MARKERS ( 14 Nov 2022 07:43 )  x     / x     / 313 U/L / x     / x        11-15    137  |  104  |  9   ----------------------------<  92  4.4   |  24  |  0.71    Ca    8.8      15 Nov 2022 13:38  Mg     1.9     11-15    proBNP:   Lipid Profile:   HgA1c:   TSH:     TELEMETRY: SB/SR 45-80	    ECG:  	  RADIOLOGY:   DIAGNOSTIC TESTING:  [ ] Echocardiogram:  [ ]  Catheterization:  [ ] Stress Test:    OTHER: 	  
Patient is a 42y old  Male who presents with a chief complaint of headache, back pain (15 Nov 2022 13:02)      SUBJECTIVE / OVERNIGHT EVENTS: sinus 70-90s on tele, reports mild headache and back pain, no cp, sob, dizziness, weakeness     MEDICATIONS  (STANDING):  dextrose 50% Injectable 25 Gram(s) IV Push once  dextrose 50% Injectable 12.5 Gram(s) IV Push once  dextrose 50% Injectable 25 Gram(s) IV Push once  dextrose Oral Gel 15 Gram(s) Oral once  glucagon  Injectable 1 milliGRAM(s) IntraMuscular once  influenza   Vaccine 0.5 milliLiter(s) IntraMuscular once  lactated ringers. 1000 milliLiter(s) (200 mL/Hr) IV Continuous <Continuous>    MEDICATIONS  (PRN):  acetaminophen     Tablet .. 650 milliGRAM(s) Oral every 6 hours PRN Temp greater or equal to 38C (100.4F), Mild Pain (1 - 3)        CAPILLARY BLOOD GLUCOSE      POCT Blood Glucose.: 109 mg/dL (2022 21:11)  POCT Blood Glucose.: 94 mg/dL (2022 16:56)    I&O's Summary      PHYSICAL EXAM:  GENERAL: NAD, well-developed  HEAD:  Atraumatic, Normocephalic  EYES: EOMI, PERRLA, conjunctiva and sclera clear  NECK: Supple, No JVD  CHEST/LUNG: Clear to auscultation bilaterally; No wheeze  HEART: Regular rate and rhythm; No murmurs, rubs, or gallops  ABDOMEN: Soft, Nontender, Nondistended; Bowel sounds present  EXTREMITIES:  2+ Peripheral Pulses, No clubbing, cyanosis, or edema  PSYCH: AAOx3  NEUROLOGY: non-focal  SKIN: No rashes or lesions    LABS:                        14.8   10.50 )-----------( 448      ( 2022 07:43 )             45.6     11-15    137  |  104  |  9   ----------------------------<  92  4.4   |  24  |  0.71    Ca    8.8      15 Nov 2022 13:38  Phos  2.9     11-14  Mg     1.9     11-15    TPro  6.2  /  Alb  3.6  /  TBili  0.8  /  DBili  x   /  AST  13  /  ALT  13  /  AlkPhos  60  11-14      CARDIAC MARKERS ( 2022 07:43 )  x     / x     / 313 U/L / x     / x          Urinalysis Basic - ( 2022 02:35 )    Color: Yellow / Appearance: Clear / S.036 / pH: x  Gluc: x / Ketone: Small  / Bili: Small / Urobili: 6 mg/dL   Blood: x / Protein: Trace / Nitrite: Negative   Leuk Esterase: Negative / RBC: 5 /hpf / WBC 2 /HPF   Sq Epi: x / Non Sq Epi: 0 /hpf / Bacteria: Negative        RADIOLOGY & ADDITIONAL TESTS:    Imaging Personally Reviewed:    Consultant(s) Notes Reviewed:      Care Discussed with Consultants/Other Providers:

## 2022-11-17 ENCOUNTER — EMERGENCY (EMERGENCY)
Facility: HOSPITAL | Age: 42
LOS: 1 days | Discharge: HOME | End: 2022-11-17
Admitting: EMERGENCY MEDICINE
Payer: SELF-PAY

## 2022-11-17 VITALS
HEIGHT: 71 IN | TEMPERATURE: 98 F | SYSTOLIC BLOOD PRESSURE: 158 MMHG | WEIGHT: 220.02 LBS | DIASTOLIC BLOOD PRESSURE: 101 MMHG | RESPIRATION RATE: 18 BRPM | OXYGEN SATURATION: 100 % | HEART RATE: 52 BPM

## 2022-11-17 VITALS — SYSTOLIC BLOOD PRESSURE: 131 MMHG | DIASTOLIC BLOOD PRESSURE: 94 MMHG

## 2022-11-17 LAB
PROINSULIN SERPL-MCNC: 29.5 PMOL/L — HIGH (ref 0–10)

## 2022-11-17 PROCEDURE — 99284 EMERGENCY DEPT VISIT MOD MDM: CPT

## 2022-11-17 PROCEDURE — 99283 EMERGENCY DEPT VISIT LOW MDM: CPT

## 2022-11-17 RX ORDER — IBUPROFEN 200 MG
600 TABLET ORAL ONCE
Refills: 0 | Status: COMPLETED | OUTPATIENT
Start: 2022-11-17 | End: 2022-11-17

## 2022-11-17 RX ORDER — ACETAMINOPHEN 500 MG
1000 TABLET ORAL ONCE
Refills: 0 | Status: COMPLETED | OUTPATIENT
Start: 2022-11-17 | End: 2022-11-17

## 2022-11-17 RX ADMIN — Medication 1000 MILLIGRAM(S): at 17:01

## 2022-11-17 RX ADMIN — Medication 600 MILLIGRAM(S): at 17:01

## 2022-11-17 NOTE — ED PROVIDER NOTE - PRO INTERPRETER NEED 2
Patient has received a full dose of aspirin and is started on IV heparin, monitor APTT initially  Nitro and morphine when necessary for chest pain  Cardiology on  Cath lab post MITUL on LAD  On asa, effient, statin, metoprolol    Echo:  Normal left ventricular systolic function.  Left ventricular ejection fraction is visually estimated to be 55%.  There is apical anterior, apical septal apical inferior, and apical   akinesis.  Mid anterior and mid anteroseptal hypokinesis.  Normal diastolic function.  The right ventricle was normal in size and function.  Mild mitral regurgitation.  Moderate tricuspid regurgitation.       English

## 2022-11-17 NOTE — ED PROVIDER NOTE - CLINICAL SUMMARY MEDICAL DECISION MAKING FREE TEXT BOX
42 M pmh chronic HAs and back pain p/w acute on chronic HA/Back pain x 2 days.  reports cannot afford otc meds so comes to ED.  no acute changes to sxs.  pt comfortable appearing, ambulatory and unremarkable exam.  given analgesia and dc in stable condition.  discussed strict return parameters

## 2022-11-17 NOTE — ED PROVIDER NOTE - OBJECTIVE STATEMENT
42 M pmh chronic HAs and back pain p/w acute on chronic HA/Back pain x 2 days.  reports intermittent diffuse dull/throbbing HAs and aching lower back pain for past 6 mons.  took ASA yesterday w/ mild relief.  cannot afford OTC meds so comes to ED, undomiciled.  Denies fever, numbness, weakness, vision changes, neck pain/stiffness, difficulty walking, bowel/bladder dysfunction, dysuria, hematuria, saddle anesthesia, h/o CA, h/o IVDA, fall/trauma

## 2022-11-17 NOTE — ED PROVIDER NOTE - NSFOLLOWUPINSTRUCTIONS_ED_ALL_ED_FT
Please rest and remain well hydrated with plenty of fluids.  You can take motrin 600-800mg and tylenol 650mg every 3 hours, switching between the two for pain/bodyaches or fevers (>100.4F/>38C)    Headache    A headache is pain or discomfort felt around the head or neck area. The specific cause of a headache may not be found as there are many types including tension headaches, migraine headaches, and cluster headaches. Watch your condition for any changes. Things you can do to manage your pain include taking over the counter and prescription medications as instructed by your health care provider, lying down in a dark quiet room, limiting stress, getting regular sleep, and refraining from alcohol and tobacco products.    SEEK IMMEDIATE MEDICAL CARE IF YOU HAVE ANY OF THE FOLLOWING SYMPTOMS: fever, vomiting, stiff neck, loss of vision, problems with speech, muscle weakness, loss of balance, trouble walking, passing out, or confusion.    Back Pain    Back pain is very common in adults. The cause of back pain is rarely dangerous and the pain often gets better over time. The cause of your back pain may not be known and may include strain of muscles or ligaments, degeneration of the spinal disks, or arthritis. Occasionally the pain may radiate down your leg(s). Over-the-counter medicines to reduce pain and inflammation are often the most helpful. Stretching and remaining active frequently helps the healing process.     SEEK IMMEDIATE MEDICAL CARE IF YOU HAVE ANY OF THE FOLLOWING SYMPTOMS: bowel or bladder control problems, unusual weakness or numbness in your arms or legs, nausea or vomiting, abdominal pain, fever, dizziness/lightheadedness.

## 2022-11-17 NOTE — ED PROVIDER NOTE - PATIENT PORTAL LINK FT
You can access the FollowMyHealth Patient Portal offered by [facility] by registering at the following website: [email or web address]. By joining [employer]’s FollowMyHealth portal, you will also be able to view your health information using other applications (apps) compatible with our system.

## 2022-11-17 NOTE — ED PROVIDER NOTE - PHYSICAL EXAMINATION
Vitals reviewed  Gen: comfortable appearing at rest, in nad, speaking in full sentences  Skin: wwp, no rash/lesions  HEENT: ncat, eomi, perrla, no nystagmus, mmm  Back: no midline ttp/step off, no paraspinal ttp, neg SLR, ROM not limited    CV: rrr, no audible m/r/g  Resp: symmetrical expansion, ctab, no w/r/r  Ext: FROM throughout, no peripheral edema, 5/5 strength all ext, SILT equal throughout, distal pulses 2+  Neuro: alert/oriented, no focal deficits, steady gait without assistance

## 2022-11-20 DIAGNOSIS — G89.29 OTHER CHRONIC PAIN: ICD-10-CM

## 2022-11-20 DIAGNOSIS — R51.9 HEADACHE, UNSPECIFIED: ICD-10-CM

## 2022-11-20 DIAGNOSIS — M54.50 LOW BACK PAIN, UNSPECIFIED: ICD-10-CM

## 2022-11-23 LAB
CORTICOSTEROID BINDING GLOBULIN RESULT: 1.3 MG/DL — LOW
CORTIS F/TOTAL MFR SERPL: 23 % — SIGNIFICANT CHANGE UP
CORTIS SERPL-MCNC: 10 UG/DL — SIGNIFICANT CHANGE UP
CORTISOL, FREE RESULT: 2.3 UG/DL — HIGH

## 2022-12-19 ENCOUNTER — EMERGENCY (EMERGENCY)
Facility: HOSPITAL | Age: 42
LOS: 1 days | Discharge: HOME | End: 2022-12-19
Attending: EMERGENCY MEDICINE | Admitting: EMERGENCY MEDICINE
Payer: SELF-PAY

## 2022-12-19 VITALS
OXYGEN SATURATION: 97 % | TEMPERATURE: 97 F | HEIGHT: 71 IN | SYSTOLIC BLOOD PRESSURE: 157 MMHG | WEIGHT: 220.02 LBS | HEART RATE: 62 BPM | DIASTOLIC BLOOD PRESSURE: 89 MMHG | RESPIRATION RATE: 18 BRPM

## 2022-12-19 PROCEDURE — 99282 EMERGENCY DEPT VISIT SF MDM: CPT

## 2022-12-19 PROCEDURE — 99283 EMERGENCY DEPT VISIT LOW MDM: CPT

## 2022-12-19 PROCEDURE — 99053 MED SERV 10PM-8AM 24 HR FAC: CPT

## 2022-12-20 NOTE — ED PROVIDER NOTE - NSFOLLOWUPINSTRUCTIONS_ED_ALL_ED_FT
Viral Respiratory Infection    A viral respiratory infection is an illness that affects parts of the body used for breathing, like the lungs, nose, and throat. It is caused by a germ called a virus. Symptoms can include runny nose, coughing, sneezing, fatigue, body aches, sore throat, fever, or headache. Over the counter medicine can be used to manage the symptoms but the infection typically goes away on its own in 5 to 10 days.     SEEK IMMEDIATE MEDICAL CARE IF YOU HAVE ANY OF THE FOLLOWING SYMPTOMS: shortness of breath, chest pain, fever over 10 days, or lightheadedness/dizziness.    [facility]  Family Medicine  [address] Floor  [place], [place] [address]  Phone: [phone]

## 2022-12-20 NOTE — ED ADULT NURSE NOTE - NSFALLRSKASSESSTYPE_ED_ALL_ED
Neonatology Antepartum Counseling Consult:  I was asked to provide antepartum counseling for Ruth Persaud at the request of Nahomi Crump MD secondary to  labor. Ms. Ruth Persaud is currently 33 weeks/ 6 days gestation and has a history significant for:  Patient Active Problem List   Diagnosis     Encounter for triage in pregnant patient      contractions      labor      Betamethasone was administered on , , .   Ms. Ruth Persaud, accompanied by her spouse, was counseled on the expected hospital course, potential risks, and outcomes associated with an infant born at this gestation. The counseling included: morbidity, mortality, initial delivery room stabilization, respiratory course, lung development, hyperbilirubinemia, infection, intraventricular hemorrhage, nutrition, growth and development, and long term outcomes.  I also explained the basic four criteria for discharge: that the baby had to be free of apnea; able to maintain their body temperature; able to feed by bottle or breast well enough to; attain an adequate pattern of weight gain and growth.  The patient had no remaining questions but was encouraged to contact the NICU via their caregivers should any arise.  Please feel free to call and thank you for involving the NICU team in the care of your patient.      Floor Time (min): 15  Face to Face Time (min): 15  Total Time (minutes): 30  More than 50% of my time was spent in direct, face to face, antepartum counseling with the above patient.    Caroline Luna NP on 2022 at 9:23 PM   
Initial (On Arrival)

## 2022-12-20 NOTE — ED PROVIDER NOTE - PHYSICAL EXAMINATION
CONSTITUTIONAL: Awake, alert and in no apparent distress.  HEENT: Head is atraumatic. Eyes clear bilaterally, normal EOMI. Airway patent.  CARDIAC: Normal rate, regular rhythm.  Heart sounds S1, S2.   RESPIRATORY: Breath sounds clear and equal bilaterally. no tachypnea, respiratory distress.   PSYCHIATRIC: Normal mood and affect. no apparent risk to self or others.

## 2022-12-20 NOTE — ED ADULT NURSE NOTE - OBJECTIVE STATEMENT
Pt reports chills, fever, cough and congestion. Pt denies any CP/SOB, no N/V/D. Pt reports dry cough, fever improved post Tylenol. Pt AOx4, ambulatory with steady gait.

## 2022-12-20 NOTE — ED PROVIDER NOTE - OBJECTIVE STATEMENT
41 yo with complaints of rhinorrhea, and non prod cough since yesterday, denies sob, chest pain. Pt has not tried anything for symptoms, no other aggravating or relieving factors.

## 2022-12-20 NOTE — ED PROVIDER NOTE - PATIENT PORTAL LINK FT
You can access the FollowMyHealth Patient Portal offered by [facility] by registering at the following website: [email or web address]. By joining [employer]’s FollowMyHealth portal, you will also be able to view your health information using other applications (apps) compatible with our system.

## 2022-12-20 NOTE — ED PROVIDER NOTE - CLINICAL SUMMARY MEDICAL DECISION MAKING FREE TEXT BOX
Pt w uri symptoms, hds, advised continued supportive care with otc medications. Pt then walked out of the ER prior to discharge papers.

## 2022-12-22 DIAGNOSIS — J34.89 OTHER SPECIFIED DISORDERS OF NOSE AND NASAL SINUSES: ICD-10-CM

## 2023-03-11 NOTE — ED ADULT NURSE NOTE - CCCP TRG CHIEF CMPLNT
[facility]  Neurology Consult    Date:  3/11/2023  Patient Name:  Fernando Guillen  YOB: 1962  MRN: [id number]     PCP:  Frida Bojorquez MD   Referring:  No ref. provider found      Chief Complaint: difficulty swallowing    History obtained from: patient    Argelia Fernandez is a 63 y.o. female admitted for difficulty swallowing reporting pain in the back of her throat at times burning as well as migrainous type symptoms. 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 This may have been exacerbated by the concurrent use of risperidone with Caplyta. She is also likely at risk for chronic pain syndromes given her history of prior right hand injury with right upper extremity complex regional pain syndrome. ADDENDUM: This may all be related to recent strep throat. Plan  CT of the neck soft tissues with and without contrast  Request evaluation from ENT for any structural causes of pain in the posterior throat - recs very much appreciated  Request evaluation from psychiatry given concurrent use of risperidone and the Caplyta  If no structural etiologies to consider treating as migraine//autonomic cephalalgia entity  Will start PRN Toradol at present  Will trial clonazepam 0.25 mg HS  Check VPA level        History of Present Illness:  Fernando Guillen is a 63 y.o. right handed female presenting for evaluation of difficulty swallowing. She states since yesterday morning that she was having difficulty swallowing due to a painful feeling in her mouth and throat. She also notes coughing but no recent sick contacts at home. She describes blurred vision as well as black dots scattered throughout her visual fields with nausea but no vomiting. She also describes bilateral monocular diplopia. There is an associated bilateral frontal headache as well.  She denies any personal history of headaches or any family history of migraines. She states she was started on a new medication about 1 month ago called Roly Anna which was for her depression and anxiety. She states she was also prescribed Olga Ralphs but had not yet started taking it. ADDENDUM: The patient's  states she has just been getting treated for her sore throat for laryngitis and is supposedly on day 5 of amoxicillin. He also reports that she is no longer taking risperidone and has started Olga Ralphs.      Medical History:   Past Medical History:   Diagnosis Date    Abdominal pain     For EGD 1-13-23    Anxiety     Arthritis     Blood circulation, collateral     Cancer (HCC)     SKIN    Chronic back pain     Colitis     recent    Complex regional pain syndrome type 1 of right lower extremity 09/11/2015    COPD (chronic obstructive pulmonary disease) ([facility])     Depression     GERD (gastroesophageal reflux disease)     Headache(784.0)     Hyperlipidemia 10/17/2014    Kidney stone     Neuromuscular disorder ([facility] [address]     On home O2     3 liters nc-Ordered by Dr. Eduardo Escalante 1/10/23, to receive on 1/12/23 per     Osteoarthritis     Other disorders of kidney and ureter in diseases classified elsewhere     Pneumonia 12/2022    Psychiatric problem     RSD (reflex sympathetic dystrophy)         Surgical History:   Past Surgical History:   Procedure Laterality Date    BACK SURGERY  2005    had cerv SCS at [address] then had staph infec 2003  then it was changed to a dual SCS by Dr Tobar [facility] 2005 approx then has had several battery changes and rechargeable put in 2009    [address]      bryant hands    COLONOSCOPY      ENDOSCOPY, COLON, DIAGNOSTIC      FINGER AMPUTATION      index right hand multiple surgeries    HYSTERECTOMY (CERVIX STATUS UNKNOWN)      LAMINECTOMY      cervical    NERVE BLOCK N/A 08/19/2015    cerv facet #1 with iv anesthesia    NERVE BLOCK Left 08/26/2015    left cervical paravertebral facet block #2 c4 5 c5 6 c6 7 under iv sedation NERVE BLOCK  2015    lumbar parasympathetic #1    NERVE BLOCK Right 2015    right sympathetic block #2    OTHER SURGICAL HISTORY  2013    surgical replacement of medtronic cervical spinal cord stimulator electrode and connect to existing battery right hip    OTHER SURGICAL HISTORY N/A 2014    Surgical revision spinal cord stimulator scar at anchor site due to wound  dehisence    OTHER SURGICAL HISTORY Right 2014    EXCISION OF CYST RIGHT SHOULDER    OTHER SURGICAL HISTORY  2015    surgical revision medtronic cervical spinal cord stimulator scar at anchor site due to wound dehisance    OTHER SURGICAL HISTORY  2018    spinal cord stimulator battery replacement due to end of life    AL REVJ/RMVL IMPLANTED SPINAL NEUROSTIM GENERATOR N/A 2018    SPINAL CORD STIMULATOR BATTERY REPLACEMENT DUE TO END OF LIFE performed by Alexa Christopher DO at [address] N/A 2023    EGD BIOPSY performed by Lorraine Rodriguez MD at [facility] ENDOSCOPY        Family History:   Family History   Problem Relation Age of Onset    Other Mother        Social History:  Social History     Tobacco Use    Smoking status: Former     Packs/day: 0.50     Years: 42.00     Pack years: 21.00     Types: Cigarettes     Start date: 1970     Quit date: 2022     Years since quittin.3     Passive exposure: Current    Smokeless tobacco: Never   Vaping Use    Vaping Use: Some days   Substance Use Topics    Alcohol use: No     Alcohol/week: 0.0 standard drinks    Drug use: No        Current Medications:      Current Facility-Administered Medications   Medication Dose Route Frequency Provider Last Rate Last Admin    aspirin EC tablet 325 mg  325 mg Oral Daily Mara Engel, DO   325 mg at 23 1250    HYDROmorphone (DILAUDID) tablet 4 mg  4 mg Oral Q6H PRN Mara Engel, DO   4 mg at 23 1506    melatonin tablet 3 mg  3 mg Oral Nightly Mara Engel DO pantoprazole (PROTONIX) tablet 40 mg  40 mg Oral BID Ricks Class, DO   40 mg at 03/11/23 1250    pregabalin (LYRICA) capsule 75 mg  75 mg Oral TID Ricks Class, DO   75 mg at 03/11/23 1249    zolpidem (AMBIEN) tablet 5 mg  5 mg Oral Nightly PRN Ricks Class, DO        sodium chloride flush 0.9 % injection 5-40 mL  5-40 mL IntraVENous 2 times per day Ricks Class, DO   10 mL at 03/11/23 1307    sodium chloride flush 0.9 % injection 5-40 mL  5-40 mL IntraVENous PRN Ricks Class, DO        0.9 % sodium chloride infusion   IntraVENous PRN Ricks Class, DO        ondansetron (ZOFRAN-ODT) disintegrating tablet 4 mg  4 mg Oral Q8H PRN Ricks Class, DO        Or    ondansetron [facility]) injection 4 mg  4 mg IntraVENous Q6H PRN Ricks Class, DO        polyethylene glycol (GLYCOLAX) packet 17 g  17 g Oral Daily PRN Ricks Class, DO        enoxaparin (LOVENOX) injection 40 mg  40 mg SubCUTAneous Daily Ricks Class, DO   40 mg at 03/11/23 1248    acetaminophen (TYLENOL) tablet 650 mg  650 mg Oral Q6H PRN Ricks Class, DO        Or    acetaminophen (TYLENOL) suppository 650 mg  650 mg Rectal Q6H PRN Ricks Class, DO        budesonide (PULMICORT) nebulizer suspension 500 mcg  500 mcg Nebulization BID Ricks Class, DO   500 mcg at 03/11/23 0846    ipratropium-albuterol (DUONEB) nebulizer solution 1 ampule  1 ampule Inhalation Q4H WA Ricks Class, DO   1 ampule at 03/11/23 1552    albuterol (PROVENTIL) nebulizer solution 2.5 mg  2.5 mg Nebulization Q2H PRN Ricks Class, DO        methylPREDNISolone sodium (SOLU-MEDROL) injection 40 mg  40 mg IntraVENous Q6H Ricks Class, DO   40 mg at 03/11/23 1248    Followed by    Jostin Hernandes ON 3/13/2023] predniSONE (DELTASONE) tablet 40 mg  40 mg Oral Daily Ricks Class, DO        benztropine (COGENTIN) tablet 2 mg  2 mg Oral Daily Peg Stai, MD        divalproex (DEPAKOTE ER) extended release tablet 500 mg  500 mg Oral Nightly Sharonda Gillis MD        sertraline (ZOLOFT) tablet 100 mg  100 mg Oral Daily Tree Hatch MD   100 mg at 03/11/23 1506    sucralfate (CARAFATE) tablet 1 g  1 g Oral 4x Daily Tree Hatch MD        Lumateperone Tosylate CAPS 42 mg (Patient Supplied)  42 mg Oral Nightly Blaze Noyola MD        divalproex (DEPAKOTE ER) extended release tablet 250 mg  250 mg Oral QAM Eligio Scales, DO        risperiDONE (RISPERDAL) tablet 1 mg  1 mg Oral TID Eligio Scales, DO            Allergies: Allergies   Allergen Reactions    Sulfamethazine Anaphylaxis    Ancef [Cefazolin Sodium] Other (See Comments)     convulsions        Physical Examination  Vitals   Vitals:    03/11/23 1135 03/11/23 1455 03/11/23 1506 03/11/23 1552   BP:  127/81     Pulse: 89 79     Resp: 20 18 18    Temp:  97.5 °F (36.4 °C)     TempSrc:  Temporal     SpO2: 96% 97%  97%        General: Patient appears in no acute distress. HEENT: Normocephalic, atraumatic. Frequent side to side movements of jaw. Chest: no dyspnea  Heart: RRR  Extremities/Peripheral vascular: No edema/swelling noted. No cold limbs noted. Prior surgery of right hand with amputation of index finger noted. Neurologic Examination    Mental Status  Alert, and oriented to person, place and time. Speech is fluent with intact comprehension. No evidence of memory impairment. Attention and concentration appeared normal.     Cranial Nerves  II. Visual fields full to confrontation bilaterally. Fundoscopic exam: Discs not well visualized. III, IV, VI: Pupils equally round and reactive to light, 3 to 2 mm bilaterally. EOMs: full, no nystagmus. V. Facial sensation intact to light touch bilaterally  VII: Facial movements symmetric and strong  VIII: Hearing intact to voice  IX,X: Palate elevates symmetrically.  No dysarthria  XI: Sternocleidomastoid and trapezius 5/5 bilaterally   XII: Tongue is midline    Motor     Right Left   Right Left   Deltoid 5 5  Hip Flexion 5 5   Biceps 5 5  Knee Extension 5 5   Triceps 5 5  Knee Flexion 5 5   Handgrip 5 5  Ankle Dorsiflexion 5 5       Ankle Plantarflexion 5 5     Tone: Normal in all four limbs    Bulk: Normal in all four limbs with no evidence of atrophy    Pronator drift: absent bilaterally    Sensation  Light Touch: Intact distally in all four limbs    Reflexes     Right Left   Biceps 2 2   Brachioradialis 2 2   Patellar 2 2   Achilles 2 2   ankle clonus none none   Babinski absent absent     Coordination  Rapid alternating movements normal in bilateral upper extremities  Finger to nose testing normal bilaterally      Labs  Recent Labs     03/11/23  0300      K 4.1      CO2 27   BUN 13   CREATININE 0.5   GLUCOSE 102*   CALCIUM 9.0   PROT 6.9   LABALBU 3.8   BILITOT 0.3   ALKPHOS 102   AST 22   ALT 17   WBC 5.9   RBC 4.43   HGB 13.1   HCT 41.0   MCV 92.6   MCH 29.6   MCHC 32.0   RDW 13.2      MPV 10.2       Imaging  CT HEAD WO CONTRAST   Final Result   Diffuse atrophy likely age related   Findings compatible with small vessel ischemic changes. Findings were called to Dr. Kristin Antoine   Final Result   1. Estimated stenosis of the proximal right and left internal carotid   artery by NASCET criteria is not hemodynamically significant   2. Mild atherosclerotic disease . 3. No large vessel occlusion identified            This study was analyzed by the [employer]. Wunsch-Brautkleid algorithm. CT BRAIN PERFUSION   Final Result      No significant ischemic penumbra identified      This study was analyzed by the [employer]. Wunsch-Brautkleid algorithm. CTA HEAD W CONTRAST   Final Result   1. Estimated stenosis of the proximal right and left internal carotid   artery by NASCET criteria is not hemodynamically significant   2. Mild atherosclerotic disease . 3. No large vessel occlusion identified            This study was analyzed by the [employer]. ai algorithm.                CTA PULMONARY W CONTRAST   Final Result   No evidence of pulmonary embolism or acute pulmonary abnormality. RECOMMENDATIONS:   Careful clinical correlation and follow up recommended. XR CHEST PORTABLE   Final Result   No acute disease. RECOMMENDATION:   Careful clinical correlation and follow up recommended.                  Electronically signed by Elva Fonseca DO on 3/11/2023 at 4:21 PM back pain general

## 2023-03-13 ENCOUNTER — EMERGENCY (EMERGENCY)
Facility: HOSPITAL | Age: 43
LOS: 1 days | Discharge: HOME | End: 2023-03-13
Attending: EMERGENCY MEDICINE | Admitting: EMERGENCY MEDICINE
Payer: SELF-PAY

## 2023-03-13 VITALS
SYSTOLIC BLOOD PRESSURE: 141 MMHG | WEIGHT: 160.06 LBS | RESPIRATION RATE: 17 BRPM | OXYGEN SATURATION: 98 % | HEART RATE: 76 BPM | DIASTOLIC BLOOD PRESSURE: 97 MMHG | TEMPERATURE: 98 F

## 2023-03-13 LAB
ALBUMIN SERPL ELPH-MCNC: 4.2 G/DL — SIGNIFICANT CHANGE UP (ref 3.3–5)
ALP SERPL-CCNC: 65 U/L — SIGNIFICANT CHANGE UP (ref 40–120)
ALT FLD-CCNC: 9 U/L — LOW (ref 10–45)
ANION GAP SERPL CALC-SCNC: 8 MMOL/L — SIGNIFICANT CHANGE UP (ref 5–17)
APTT BLD: 38.8 SEC — HIGH (ref 27.5–35.5)
AST SERPL-CCNC: 15 U/L — SIGNIFICANT CHANGE UP (ref 10–40)
BASOPHILS # BLD AUTO: 0.09 K/UL — SIGNIFICANT CHANGE UP (ref 0–0.2)
BASOPHILS NFR BLD AUTO: 0.8 % — SIGNIFICANT CHANGE UP (ref 0–2)
BILIRUB SERPL-MCNC: 0.6 MG/DL — SIGNIFICANT CHANGE UP (ref 0.2–1.2)
BUN SERPL-MCNC: 18 MG/DL — SIGNIFICANT CHANGE UP (ref 7–23)
CALCIUM SERPL-MCNC: 9.2 MG/DL — SIGNIFICANT CHANGE UP (ref 8.4–10.5)
CHLORIDE SERPL-SCNC: 105 MMOL/L — SIGNIFICANT CHANGE UP (ref 96–108)
CO2 SERPL-SCNC: 26 MMOL/L — SIGNIFICANT CHANGE UP (ref 22–31)
CREAT SERPL-MCNC: 0.76 MG/DL — SIGNIFICANT CHANGE UP (ref 0.5–1.3)
EGFR: 114 ML/MIN/1.73M2 — SIGNIFICANT CHANGE UP
EOSINOPHIL # BLD AUTO: 0.5 K/UL — SIGNIFICANT CHANGE UP (ref 0–0.5)
EOSINOPHIL NFR BLD AUTO: 4.4 % — SIGNIFICANT CHANGE UP (ref 0–6)
GLUCOSE SERPL-MCNC: 80 MG/DL — SIGNIFICANT CHANGE UP (ref 70–99)
HCT VFR BLD CALC: 47.8 % — SIGNIFICANT CHANGE UP (ref 39–50)
HGB BLD-MCNC: 15.8 G/DL — SIGNIFICANT CHANGE UP (ref 13–17)
IMM GRANULOCYTES NFR BLD AUTO: 0.3 % — SIGNIFICANT CHANGE UP (ref 0–0.9)
INR BLD: 1.12 — SIGNIFICANT CHANGE UP (ref 0.88–1.16)
LYMPHOCYTES # BLD AUTO: 2.6 K/UL — SIGNIFICANT CHANGE UP (ref 1–3.3)
LYMPHOCYTES # BLD AUTO: 22.6 % — SIGNIFICANT CHANGE UP (ref 13–44)
MCHC RBC-ENTMCNC: 29 PG — SIGNIFICANT CHANGE UP (ref 27–34)
MCHC RBC-ENTMCNC: 33.1 GM/DL — SIGNIFICANT CHANGE UP (ref 32–36)
MCV RBC AUTO: 87.9 FL — SIGNIFICANT CHANGE UP (ref 80–100)
MONOCYTES # BLD AUTO: 1.03 K/UL — HIGH (ref 0–0.9)
MONOCYTES NFR BLD AUTO: 9 % — SIGNIFICANT CHANGE UP (ref 2–14)
NEUTROPHILS # BLD AUTO: 7.23 K/UL — SIGNIFICANT CHANGE UP (ref 1.8–7.4)
NEUTROPHILS NFR BLD AUTO: 62.9 % — SIGNIFICANT CHANGE UP (ref 43–77)
NRBC # BLD: 0 /100 WBCS — SIGNIFICANT CHANGE UP (ref 0–0)
PLATELET # BLD AUTO: 423 K/UL — HIGH (ref 150–400)
POTASSIUM SERPL-MCNC: 4.4 MMOL/L — SIGNIFICANT CHANGE UP (ref 3.5–5.3)
POTASSIUM SERPL-SCNC: 4.4 MMOL/L — SIGNIFICANT CHANGE UP (ref 3.5–5.3)
PROT SERPL-MCNC: 6.6 G/DL — SIGNIFICANT CHANGE UP (ref 6–8.3)
PROTHROM AB SERPL-ACNC: 13.4 SEC — SIGNIFICANT CHANGE UP (ref 10.5–13.4)
RBC # BLD: 5.44 M/UL — SIGNIFICANT CHANGE UP (ref 4.2–5.8)
RBC # FLD: 15 % — HIGH (ref 10.3–14.5)
SARS-COV-2 RNA SPEC QL NAA+PROBE: SIGNIFICANT CHANGE UP
SODIUM SERPL-SCNC: 139 MMOL/L — SIGNIFICANT CHANGE UP (ref 135–145)
WBC # BLD: 11.49 K/UL — HIGH (ref 3.8–10.5)
WBC # FLD AUTO: 11.49 K/UL — HIGH (ref 3.8–10.5)

## 2023-03-13 PROCEDURE — 85610 PROTHROMBIN TIME: CPT

## 2023-03-13 PROCEDURE — 70450 CT HEAD/BRAIN W/O DYE: CPT | Mod: MA

## 2023-03-13 PROCEDURE — 70450 CT HEAD/BRAIN W/O DYE: CPT | Mod: 26,MA

## 2023-03-13 PROCEDURE — 96375 TX/PRO/DX INJ NEW DRUG ADDON: CPT

## 2023-03-13 PROCEDURE — 80053 COMPREHEN METABOLIC PANEL: CPT

## 2023-03-13 PROCEDURE — 85730 THROMBOPLASTIN TIME PARTIAL: CPT

## 2023-03-13 PROCEDURE — 99284 EMERGENCY DEPT VISIT MOD MDM: CPT | Mod: 25

## 2023-03-13 PROCEDURE — 87635 SARS-COV-2 COVID-19 AMP PRB: CPT

## 2023-03-13 PROCEDURE — 36415 COLL VENOUS BLD VENIPUNCTURE: CPT

## 2023-03-13 PROCEDURE — 99284 EMERGENCY DEPT VISIT MOD MDM: CPT

## 2023-03-13 PROCEDURE — 85025 COMPLETE CBC W/AUTO DIFF WBC: CPT

## 2023-03-13 PROCEDURE — 96374 THER/PROPH/DIAG INJ IV PUSH: CPT

## 2023-03-13 RX ORDER — CYCLOBENZAPRINE HYDROCHLORIDE 10 MG/1
1 TABLET, FILM COATED ORAL
Qty: 20 | Refills: 0
Start: 2023-03-13 | End: 2023-03-22

## 2023-03-13 RX ORDER — ACETAMINOPHEN 500 MG
1000 TABLET ORAL ONCE
Refills: 0 | Status: COMPLETED | OUTPATIENT
Start: 2023-03-13 | End: 2023-03-13

## 2023-03-13 RX ORDER — IBUPROFEN 200 MG
1 TABLET ORAL
Qty: 30 | Refills: 0
Start: 2023-03-13 | End: 2023-03-22

## 2023-03-13 RX ORDER — CYCLOBENZAPRINE HYDROCHLORIDE 10 MG/1
10 TABLET, FILM COATED ORAL ONCE
Refills: 0 | Status: COMPLETED | OUTPATIENT
Start: 2023-03-13 | End: 2023-03-13

## 2023-03-13 RX ORDER — KETOROLAC TROMETHAMINE 30 MG/ML
15 SYRINGE (ML) INJECTION ONCE
Refills: 0 | Status: DISCONTINUED | OUTPATIENT
Start: 2023-03-13 | End: 2023-03-13

## 2023-03-13 RX ADMIN — Medication 400 MILLIGRAM(S): at 14:00

## 2023-03-13 RX ADMIN — CYCLOBENZAPRINE HYDROCHLORIDE 10 MILLIGRAM(S): 10 TABLET, FILM COATED ORAL at 14:00

## 2023-03-13 RX ADMIN — Medication 15 MILLIGRAM(S): at 14:00

## 2023-03-13 NOTE — ED PROVIDER NOTE - NSFOLLOWUPINSTRUCTIONS_ED_ALL_ED_FT
Follow up with a primary care doctor.     Headache    A headache is pain or discomfort felt around the head or neck area. The specific cause of a headache may not be found as there are many types including tension headaches, migraine headaches, and cluster headaches. Watch your condition for any changes. Things you can do to manage your pain include taking over the counter and prescription medications as instructed by your health care provider, lying down in a dark quiet room, limiting stress, getting regular sleep, and refraining from alcohol and tobacco products.    SEEK IMMEDIATE MEDICAL CARE IF YOU HAVE ANY OF THE FOLLOWING SYMPTOMS: fever, vomiting, stiff neck, loss of vision, problems with speech, muscle weakness, loss of balance, trouble walking, passing out, or confusion.      Back Pain    Back pain is very common in adults. The cause of back pain is rarely dangerous and the pain often gets better over time. The cause of your back pain may not be known and may include strain of muscles or ligaments, degeneration of the spinal disks, or arthritis. Occasionally the pain may radiate down your leg(s). Over-the-counter medicines to reduce pain and inflammation are often the most helpful. Stretching and remaining active frequently helps the healing process.     SEEK IMMEDIATE MEDICAL CARE IF YOU HAVE ANY OF THE FOLLOWING SYMPTOMS: bowel or bladder control problems, unusual weakness or numbness in your arms or legs, nausea or vomiting, abdominal pain, fever, dizziness/lightheadedness.

## 2023-03-13 NOTE — ED ADULT TRIAGE NOTE - MEANS OF ARRIVAL
[FreeTextEntry1] : Patient is up-to-date with colonoscopy and breast imaging studies.  She needs to follow-up with GYN for repeat Pap smear.  I gave her prescription to repeat DEXA scan.  She was reminded to have routine eye exam and dental care.
ambulatory

## 2023-03-13 NOTE — ED PROVIDER NOTE - PATIENT PORTAL LINK FT
You can access the FollowMyHealth Patient Portal offered by [facility] by registering at the following website: [email or web address]. By joining [employer]’s FollowMyHealth portal, you will also be able to view your health information using other applications (apps) compatible with our system.

## 2023-03-13 NOTE — ED PROVIDER NOTE - CLINICAL SUMMARY MEDICAL DECISION MAKING FREE TEXT BOX
42 y/o M presents to ED c/o chronic daily headache and chronic back pain with normal neuro exam. Will obtain CT head to r/o intercranial pathology. Suspect headache more likely due to chronic muscle tension. Will check for metabolic abnormalities with labs and give antiinflammatories/muscle relaxers. Likely discharge with advised f/u.

## 2023-03-13 NOTE — ED PROVIDER NOTE - PHYSICAL EXAMINATION
VITAL SIGNS: I have reviewed nursing notes and confirm.  CONSTITUTIONAL: Well-developed; in no acute distress. Repeatedly smacking lips.   SKIN: Agree with RN documentation regarding decubitus evaluation. Remainder of skin exam is warm and dry, no acute rash.  HEAD: Normocephalic; atraumatic.  EYES: PERRL, EOM intact; conjunctiva and sclera clear.  ENT: No nasal discharge; airway clear.  NECK: Supple; non tender.  CARD: S1, S2 normal; no murmurs, gallops, or rubs. Regular rate and rhythm.  RESP: No wheezes, rales or rhonchi.  ABD: Normal bowel sounds; soft; non-distended; non-tender; no hepatosplenomegaly.  MSK: Normal ROM. No clubbing, cyanosis or edema. Endorses discomfort on palpation of entire back but no pinpoint focal tenderness. Moving well without restriction.   LYMPH: No acute cervical adenopathy.  NEURO: Alert, oriented. Grossly unremarkable CN'II-XII intact, no pronator drift, nl finger to nose, clear speech, gait intact   PSYCH: Cooperative, appropriate.

## 2023-03-13 NOTE — ED ADULT NURSE NOTE - NSIMPLEMENTINTERV_GEN_ALL_ED
Implemented All Universal Safety Interventions:  [place] to call system. Call bell, personal items and telephone within reach. Instruct patient to call for assistance. Room bathroom lighting operational. Non-slip footwear when patient is off stretcher. Physically safe environment: no spills, clutter or unnecessary equipment. Stretcher in lowest position, wheels locked, appropriate side rails in place.

## 2023-03-15 DIAGNOSIS — G89.29 OTHER CHRONIC PAIN: ICD-10-CM

## 2023-03-15 DIAGNOSIS — Z20.822 CONTACT WITH AND (SUSPECTED) EXPOSURE TO COVID-19: ICD-10-CM

## 2023-03-15 DIAGNOSIS — R51.9 HEADACHE, UNSPECIFIED: ICD-10-CM

## 2023-03-15 DIAGNOSIS — M54.9 DORSALGIA, UNSPECIFIED: ICD-10-CM

## 2023-04-20 ENCOUNTER — EMERGENCY (EMERGENCY)
Facility: HOSPITAL | Age: 43
LOS: 1 days | Discharge: HOME | End: 2023-04-20
Admitting: EMERGENCY MEDICINE
Payer: SELF-PAY

## 2023-04-20 VITALS
OXYGEN SATURATION: 100 % | RESPIRATION RATE: 18 BRPM | SYSTOLIC BLOOD PRESSURE: 147 MMHG | DIASTOLIC BLOOD PRESSURE: 91 MMHG | WEIGHT: 149.91 LBS | TEMPERATURE: 98 F | HEIGHT: 71 IN | HEART RATE: 84 BPM

## 2023-04-20 DIAGNOSIS — G89.29 OTHER CHRONIC PAIN: ICD-10-CM

## 2023-04-20 DIAGNOSIS — M54.50 LOW BACK PAIN, UNSPECIFIED: ICD-10-CM

## 2023-04-20 DIAGNOSIS — Z59.01 SHELTERED HOMELESSNESS: ICD-10-CM

## 2023-04-20 PROCEDURE — 99282 EMERGENCY DEPT VISIT SF MDM: CPT

## 2023-04-20 PROCEDURE — 99284 EMERGENCY DEPT VISIT MOD MDM: CPT

## 2023-04-20 PROCEDURE — 99053 MED SERV 10PM-8AM 24 HR FAC: CPT

## 2023-04-20 RX ORDER — IBUPROFEN 200 MG
600 TABLET ORAL ONCE
Refills: 0 | Status: COMPLETED | OUTPATIENT
Start: 2023-04-20 | End: 2023-04-20

## 2023-04-20 RX ADMIN — Medication 600 MILLIGRAM(S): at 02:35

## 2023-04-20 SDOH — ECONOMIC STABILITY - HOUSING INSECURITY: SHELTERED HOMELESSNESS: Z59.01

## 2023-04-20 NOTE — ED PROVIDER NOTE - NSFOLLOWUPCLINICS_GEN_ALL_ED_FT
[facility]  Family Medicine  [address]  [place], [place] [address]  Phone: [phone]  Fax:

## 2023-04-20 NOTE — ED PROVIDER NOTE - NSFOLLOWUPINSTRUCTIONS_ED_ALL_ED_FT
Chronic Back Pain  When back pain lasts longer than 3 months, it is called chronic back pain. The cause of your back pain may not be known. Some common causes include:  Wear and tear (degenerative disease) of the bones, ligaments, or disks in your back.  Inflammation and stiffness in your back (arthritis).  People who have chronic back pain often go through certain periods in which the pain is more intense (flare-ups). Many people can learn to manage the pain with home care.    Follow these instructions at home:  Pay attention to any changes in your symptoms. Take these actions to help with your pain:    Managing pain and stiffness        If directed, apply ice to the painful area. Your health care provider may recommend applying ice during the first 24–48 hours after a flare-up begins. To do this:  Put ice in a plastic bag.  Place a towel between your skin and the bag.  Leave the ice on for 20 minutes, 2–3 times per day.  If directed, apply heat to the affected area as often as told by your health care provider. Use the heat source that your health care provider recommends, such as a moist heat pack or a heating pad.  Place a towel between your skin and the heat source.  Leave the heat on for 20–30 minutes.  Remove the heat if your skin turns bright red. This is especially important if you are unable to feel pain, heat, or cold. You may have a greater risk of getting burned.  Try soaking in a warm tub.  Activity      Avoid bending and other activities that make the problem worse.  Maintain a proper position when standing or sitting:  When standing, keep your upper back and neck straight, with your shoulders pulled back. Avoid slouching.  When sitting, keep your back straight and relax your shoulders. Do not round your shoulders or pull them backward.  Do not sit or  one place for long periods of time.  Take brief periods of rest throughout the day. This will reduce your pain. Resting in a lying or standing position is usually better than sitting to rest.  When you are resting for longer periods, mix in some mild activity or stretching between periods of rest. This will help to prevent stiffness and pain.  Get regular exercise. Ask your health care provider what activities are safe for you.  Do not lift anything that is heavier than 10 lb (4.5 kg), or the limit that you are told, until your health care provider says that it is safe. Always use proper lifting technique, which includes:  Bending your knees.  Keeping the load close to your body.  Avoiding twisting.  Sleep on a firm mattress in a comfortable position. Try lying on your side with your knees slightly bent. If you lie on your back, put a pillow under your knees.  Medicines    Treatment may include medicines for pain and inflammation taken by mouth or applied to the skin, prescription pain medicine, or muscle relaxants. Take over-the-counter and prescription medicines only as told by your health care provider.  Ask your health care provider if the medicine prescribed to you:  Requires you to avoid driving or using machinery.  Can cause constipation. You may need to take these actions to prevent or treat constipation:  Drink enough fluid to keep your urine pale yellow.  Take over-the-counter or prescription medicines.  Eat foods that are high in fiber, such as beans, whole grains, and fresh fruits and vegetables.  Limit foods that are high in fat and processed sugars, such as fried or sweet foods.  General instructions    Do not use any products that contain nicotine or tobacco, such as cigarettes, e-cigarettes, and chewing tobacco. If you need help quitting, ask your health care provider.  Keep all follow-up visits as told by your health care provider. This is important.  Contact a health care provider if:  You have pain that is not relieved with rest or medicine.  Your pain gets worse, or you have new pain.  You have a high fever.  You have rapid weight loss.  You have trouble doing your normal activities.  Get help right away if:  You have weakness or numbness in one or both of your legs or feet.  You have trouble controlling your bladder or your bowels.  You have severe back pain and have any of the following:  Nausea or vomiting.  Pain in your abdomen.  Shortness of breath or you faint.  Summary  Chronic back pain is back pain that lasts longer than 3 months.  When a flare-up begins, apply ice to the painful area for the first 24–48 hours.  Apply a moist heat pad or use a heating pad on the painful area as directed by your health care provider.  When you are resting for longer periods, mix in some mild activity or stretching between periods of rest. This will help to prevent stiffness and pain.  This information is not intended to replace advice given to you by your health care provider. Make sure you discuss any questions you have with your health care provider.

## 2023-04-20 NOTE — ED ADULT NURSE NOTE - OBJECTIVE STATEMENT
Patient complaints of lower back pain for 3-4 years and worsening today with mild HA.  Denies SOB, CP, abdominal pain, flank pain, urinary discomfort or any other discomfort at this time.  Denies seeing PCP s/p doesn't have PCP, denies taking any medications for pain. Denies injury, fall.  PMHX HTN, cholesterol as per patient.   Patient is alert and oriented, A&O4, steady gait noted. Request foods during assessment.

## 2023-04-20 NOTE — ED PROVIDER NOTE - OBJECTIVE STATEMENT
44 yo male in the Er c/o chronic lower back pain for the past 3 years. Pt states he lives at the shelter and its been very uncomfortable place to sleep. Pt reports in the past he had back injury due to fall , but never had any broken bones. pt denies any  recent acute back injury, denies numbness, weakness to his upper or lower extremities, denies CP, abdominal pain, neck pain, fever or chills. pt took Tylenol for his back pain 3 days ago and it did not helped at all.

## 2023-04-20 NOTE — ED PROVIDER NOTE - CLINICAL SUMMARY MEDICAL DECISION MAKING FREE TEXT BOX
42 yo male in the Er c/o chronic lower back pain for the past 3 years. Pt states he lives at the shelter and its been very uncomfortable place to sleep. Pt reports in the past he had back injury due to fall , but never had any broken bones. pt denies any  recent acute back injury, denies numbness, weakness to his upper or lower extremities, denies CP, abdominal pain, neck pain, fever or chills. pt took Tylenol for his back pain 3 days ago and it did not helped at all.    Pt ambulatory with steady gait and non-toxic appearing. his exam is unremarkable, no localized vertebral point of tenderness, no focal neuro deficits, no signs of skin infection,  no CVAT b/l, benign abdomen and clear lungs. will recommend NSAIds for pain, muscle relaxants. anticipate d/c for out pt care.   Off note, pt request food and place to rest now.

## 2023-04-20 NOTE — ED ADULT NURSE NOTE - DISCHARGE DATE/TIME
20-Apr-2023 02:51

## 2023-04-20 NOTE — ED PROVIDER NOTE - PATIENT PORTAL LINK FT
You can access the FollowMyHealth Patient Portal offered by [facility] by registering at the following website: [email or web address]. By joining [employer]’s FollowMyHealth portal, you will also be able to view your health information using other applications (apps) compatible with our system.

## 2023-05-21 ENCOUNTER — EMERGENCY (EMERGENCY)
Facility: HOSPITAL | Age: 43
LOS: 1 days | Discharge: HOME | End: 2023-05-21
Admitting: EMERGENCY MEDICINE
Payer: SELF-PAY

## 2023-05-21 VITALS
HEART RATE: 72 BPM | OXYGEN SATURATION: 97 % | SYSTOLIC BLOOD PRESSURE: 136 MMHG | HEIGHT: 71 IN | RESPIRATION RATE: 18 BRPM | TEMPERATURE: 98 F | DIASTOLIC BLOOD PRESSURE: 95 MMHG

## 2023-05-21 LAB
FLUAV AG NPH QL: SIGNIFICANT CHANGE UP
FLUBV AG NPH QL: SIGNIFICANT CHANGE UP
RSV RNA NPH QL NAA+NON-PROBE: SIGNIFICANT CHANGE UP
SARS-COV-2 RNA SPEC QL NAA+PROBE: SIGNIFICANT CHANGE UP

## 2023-05-21 PROCEDURE — 99053 MED SERV 10PM-8AM 24 HR FAC: CPT

## 2023-05-21 PROCEDURE — 99283 EMERGENCY DEPT VISIT LOW MDM: CPT

## 2023-05-21 PROCEDURE — 87637 SARSCOV2&INF A&B&RSV AMP PRB: CPT

## 2023-05-21 PROCEDURE — 99284 EMERGENCY DEPT VISIT MOD MDM: CPT

## 2023-05-21 RX ORDER — IBUPROFEN 200 MG
400 TABLET ORAL ONCE
Refills: 0 | Status: COMPLETED | OUTPATIENT
Start: 2023-05-21 | End: 2023-05-21

## 2023-05-21 RX ORDER — HYDROCHLOROTHIAZIDE 25 MG
1 TABLET ORAL
Qty: 14 | Refills: 0
Start: 2023-05-21

## 2023-05-21 RX ADMIN — Medication 400 MILLIGRAM(S): at 04:40

## 2023-05-21 RX ADMIN — Medication 400 MILLIGRAM(S): at 05:10

## 2023-05-21 NOTE — ED PROVIDER NOTE - NSFOLLOWUPINSTRUCTIONS_ED_ALL_ED_FT
[facility] to  your blood pressure medication.    Please schedule a follow up appointment for primary care and blood pressure check within one week.  You can call the [facility], or go to a [facility] ([facility], [place], etc).  Call for an appointment  [phone]    Return to the Emergency Department if you have any new or worsening symptoms, or if you have any concerns.  ==================  Cold Symptoms    WHAT YOU NEED TO KNOW:    A cold is an infection caused by a virus. The infection causes your upper respiratory system to become inflamed. Common symptoms of a cold include sneezing, dry throat, a stuffy nose, headache, watery eyes, and a cough. Your cough may be dry, or you may cough up mucus. You may also have muscle aches, joint pain, and tiredness. Rarely, you may have a fever. Most colds go away without treatment.    DISCHARGE INSTRUCTIONS:    Return to the emergency department if:    You have increased tiredness and weakness.    You are unable to eat.    Your heart is beating much faster than usual for you.    You see white spots in the back of your throat and your neck is swollen and sore to the touch.    You see pinpoint or larger reddish-purple dots on your skin.  Contact your healthcare provider if:    You have a fever higher than 102°F (38.9°C).    You have new or worsening shortness of breath.    You have thick nasal drainage for more than 2 days.    Your symptoms do not improve or get worse within 5 days.    You have questions or concerns about your condition or care.  Medicines: The following medicines may be suggested by your healthcare provider to decrease your cold symptoms. These medicines are available without a doctor's order. Ask which medicines to take and when to take them. Follow directions.    NSAIDs or acetaminophen help to bring down a fever or decrease pain.    Decongestants help decrease nasal stuffiness.    Antihistamines help decrease sneezing and a runny nose.    Cough suppressants help decrease how much you cough.    Expectorants help loosen mucus so you can cough it up.    Take your medicine as directed. Contact your healthcare provider if you think your medicine is not helping or if you have side effects. Tell your provider if you are allergic to any medicine. Keep a list of the medicines, vitamins, and herbs you take. Include the amounts, and when and why you take them. Bring the list or the pill bottles to follow-up visits. Carry your medicine list with you in case of an emergency.  Symptom relief: The following may help relieve cold symptoms, such as a dry throat and congestion:    Gargle with mouthwash or warm salt water as directed.    Suck on throat lozenges or hard candy.    Use a cold or warm vaporizer or humidifier to ease your breathing.    Rest for at least 2 days and then as needed to decrease tiredness and weakness.    Use petroleum based jelly around your nostrils to decrease irritation from blowing your nose.  Drink liquids: Liquids will help thin and loosen thick mucus so you can cough it up. Liquids will also keep you hydrated. Ask your healthcare provider which liquids are best for you and how much to drink each day.    Prevent the spread of germs: You can spread your cold germs to others for at least 3 days after your symptoms start. Wash your hands often. Do not share items, such as eating utensils. Cover your nose and mouth when you cough or sneeze using the crook of your elbow instead of your hands. Throw used tissues in the garbage.    Do not smoke: Smoking may worsen your symptoms and increase the length of time you feel sick. Talk with your healthcare provider if you need help to stop smoking.    Follow up with your doctor as directed: Write down your questions so you remember to ask them during your visits.  ==================  Chronic Hypertension    WHAT YOU NEED TO KNOW:    What is chronic hypertension? Hypertension is considered chronic when it continues for 3 months or longer. Hypertension that continues causes your heart to work much harder than normal, which may lead to heart damage. Even if you have hypertension for years, lifestyle changes, medicines, or both may help lower your blood pressure.    What do I need to know about the stages of hypertension? Your healthcare provider will give you a blood pressure goal based on your age, health, and risk for cardiovascular disease. The following are general guidelines on the stages of hypertension:    Normal blood pressure is 119/79 or lower. Your provider may only check your blood pressure each year if it stays at a normal level.    Elevated blood pressure is 120/79 to 129/79. This is sometimes called prehypertension. Your provider may suggest lifestyle changes to help lower your blood pressure to a normal level. He or she may then check it again in 3 to 6 months.    Stage 1 hypertension is 130/80 to 139/89. Your provider may recommend lifestyle changes, medication, and checks every 3 to 6 months until your blood pressure is controlled.    Stage 2 hypertension is 140/90 or higher. Your provider will recommend lifestyle changes and have you take 2 kinds of hypertension medicines. You will also need to have your blood pressure checked monthly until it is controlled.  Blood Pressure Readings  How is chronic hypertension treated? Your healthcare provider may add, remove, or change any of the following medicines. Do not change or stop taking any of your current medicines without talking to your provider.    Antihypertensives may be used to help lower your blood pressure. Several kinds of medicines are available. Your provider may change the medicine or medicines you currently take. This may be needed if your blood pressure is often high when you check it at home or you are having other problems with blood pressure control.    Diuretics help decrease extra fluid that collects in your body. This can help lower your blood pressure. You may urinate more often while you take this medicine.    Cholesterol medicine helps lower your cholesterol level. A low cholesterol level helps prevent heart disease and makes it easier to control your blood pressure.  What can I do to manage chronic hypertension?    Check your blood pressure at home. Do not smoke, have caffeine, or exercise for at least 30 minutes before you check your blood pressure. Sit and rest for 5 minutes before you check your blood pressure. Extend your arm and support it on a flat surface. Your arm should be at the same level as your heart. Follow the directions that came with your blood pressure monitor. Check your blood pressure 2 times, 1 minute apart, before you take your medicine in the morning. Also check your blood pressure before your evening meal. Keep a record of your readings and bring it to your follow-up visits. Your healthcare provider may use the readings to make changes to your treatment plan.      Manage any other health conditions you have. Health conditions such as diabetes can increase your risk for hypertension. Follow your provider's instructions and take all your medicines as directed. Talk to your provider about any new health conditions you have recently developed.    Ask about all medicines. Certain medicines can increase your blood pressure. Examples include oral birth control pills, decongestants, herbal supplements, and NSAIDs, such as ibuprofen. Your provider can tell you which medicines are safe for you to take. This includes prescription and over-the-counter medicines.  What lifestyle changes can I make to lower my blood pressure? Your healthcare provider may want you to make more lifestyle changes if you are having trouble controlling your blood pressure. This may feel difficult over time, especially if you think you are making good changes but your pressure is still high. It might help to focus on one new change at a time. For example, try to add 1 more day of exercise, or exercise for an extra 10 minutes on 2 days. Small changes can make a big difference. Your provider can also refer you to specialists such as a dietitian who can help you make small changes. Your family members may be included in helping you learn to create lifestyle changes, such as the following:  Ways to Lower Your Blood Pressure    Limit sodium (salt) as directed. Too much sodium can affect your fluid balance. Check labels to find low-sodium or no-salt-added foods. Some low-sodium foods use potassium salts for flavor. Too much potassium can also cause health problems. Your provider will tell you how much sodium and potassium are safe for you to have in a day. He or she may recommend that you limit sodium to 2,300 mg a day.      Follow the meal plan recommended by your provider. A dietitian or your provider can give you more information on low-sodium plans or the DASH (Dietary Approaches to Stop Hypertension) eating plan. The DASH plan is low in sodium, processed sugar, unhealthy fats, and total fat. It is high in potassium, calcium, and fiber. These can be found in vegetables, fruit, and whole-grain foods.      Be physically active throughout the day. Physical activity, such as exercise, can help control your blood pressure and your weight. Be physically active for at least 30 minutes per day, on most days of the week. Include aerobic activity, such as walking or riding a bicycle. Also include strength training at least 2 times each week. Your provider can help you create a physical activity plan.      Decrease stress. This may help lower your blood pressure. Learn ways to relax, such as deep breathing or listening to music.    Limit alcohol as directed. Alcohol can increase your blood pressure. A drink of alcohol is 12 ounces of beer, 5 ounces of wine, or 1½ ounces of liquor. Your provider can help you set daily and weekly drink limits. He or she may recommend no alcohol if your blood pressure stays higher than goal even with medicine or other measures. Ask your provider for information if you need help to quit.    Do not smoke. Nicotine and other chemicals in cigarettes and cigars can increase your blood pressure and also cause lung damage. Ask your provider for information if you currently smoke and need help to quit. E-cigarettes or smokeless tobacco still contain nicotine. Talk to your healthcare provider before you use these products.  Prevent Heart Disease   Call your local emergency number (911 in the [place]) or have someone call if:    You have chest pain.    You have any of the following signs of a heart attack:  Squeezing, pressure, or pain in your chest    You may also have any of the following:  Discomfort or pain in your back, neck, jaw, stomach, or arm    Shortness of breath    Nausea or vomiting    Lightheadedness or a sudden cold sweat    You become confused or have difficulty speaking.    You suddenly feel lightheaded or have trouble breathing.  When should I seek immediate care?    You have a severe headache or vision loss.    You have weakness in an arm or leg.  When should I call my doctor or cardiologist?    You feel faint, dizzy, confused, or drowsy.    You have been taking your blood pressure medicine but your pressure is higher than your provider says it should be.    You have questions or concerns about your condition or care.  CARE AGREEMENT:    You have the right to help plan your care. Learn about your health condition and how it may be treated. Discuss treatment options with your healthcare providers to decide what care you want to receive. You always have the right to refuse treatment.

## 2023-05-21 NOTE — ED PROVIDER NOTE - OBJECTIVE STATEMENT
43-year-old male with history of hypertension, hyperlipidemia, chronic back pain -  presents to ED complaining of "headache, head cold, and back pain".  Patient states his back pain and headaches are chronic but he feels like he has had a "head cold" with nasal congestion and rhinorrhea for the past week and a half.  Headache onset gradual, waxes and wanes, not severe.  Denies fever or chills, cough, chest pain, shortness of breath throat pain abdominal pain, nausea/vomiting/diarrhea, other acute complaints.  Taking Tylenol with some relief.

## 2023-05-21 NOTE — ED ADULT NURSE NOTE - NSFALLUNIVINTERV_ED_ALL_ED
Bed/Stretcher in lowest position, wheels locked, appropriate side rails in place/Call bell, personal items and telephone in reach/Instruct patient to call for assistance before getting out of bed/chair/stretcher/Non-slip footwear applied when patient is off stretcher/[place] to call system/Physically safe environment - no spills, clutter or unnecessary equipment/Purposeful proactive rounding/Room/bathroom lighting operational, light cord in reach

## 2023-05-21 NOTE — ED PROVIDER NOTE - PATIENT PORTAL LINK FT
You can access the FollowMyHealth Patient Portal offered by [facility] by registering at the following website: [email or web address]. By joining [employer]’s FollowMyHealth portal, you will also be able to view your health information using other applications (apps) compatible with our system.

## 2023-05-21 NOTE — ED PROVIDER NOTE - NSFOLLOWUPCLINICS_GEN_ALL_ED_FT
[facility]  Family Medicine  [address]  [place], [place] [address]  Phone: [phone]  Fax:

## 2023-05-21 NOTE — ED PROVIDER NOTE - PHYSICAL EXAMINATION
CONSTITUTIONAL: NAD   SKIN: Normal color and turgor.    HEAD: NC/AT.  EYES: Conjunctiva clear. EOMI. PERRL.    ENT: Airway clear. Normal voice. Edentulous.  No rhinorrhea.    RESPIRATORY:  Normal work of breathing. Lungs CTAB.  CARDIOVASCULAR:  RRR, S1S2. No M/R/G.      GI:  Abdomen soft, nontender.    MSK: Neck supple.  No LE edema or calf tenderness. No joint swelling or ROM limitation.  NEURO: Alert; CN: grossly intact. Speech clear.  ORDAZ. Gait steady.

## 2023-05-21 NOTE — ED ADULT NURSE REASSESSMENT NOTE - NSFALLUNIVINTERV_ED_ALL_ED
Bed/Stretcher in lowest position, wheels locked, appropriate side rails in place/Call bell, personal items and telephone in reach/Instruct patient to call for assistance before getting out of bed/chair/stretcher/Non-slip footwear applied when patient is off stretcher/[place] to call system/Physically safe environment - no spills, clutter or unnecessary equipment/Purposeful proactive rounding/Room/bathroom lighting operational, light cord in reach

## 2023-05-21 NOTE — ED PROVIDER NOTE - CLINICAL SUMMARY MEDICAL DECISION MAKING FREE TEXT BOX
Patient with nasal congestion and rhinorrhea for the past week and a half, as well as headache and back pain.  Vital signs within normal limits, afebrile; patient is well-appearing without neck stiffness, or other suggestion of meningitis.  Lungs CTA, abdomen benign.  No LE edema. Will swab for flu-covid-RSV, treat with Tylenol.  Mildly hypertensive diastolic BP 95 - do not think this is a hypertensive headache; pt states he hasn't taken BP meds in "quite a while" and does not know what med he has been prescribed for it. He doesn't know when he will be going back to his home town primary care provider, so will prescribe an antihypertensive for him and refer him to a primary care clinic.  He is asking for the prescription to be sent to the hospital pharmacy and he will pick it up in the daytime.

## 2023-05-21 NOTE — ED ADULT NURSE NOTE - NS_SISCREENINGSR_GEN_ALL_ED
Problem: Problem Solving STGs  Goal: STG-Within one week, patient will  1) Individualized goal:  Complete simple financial math problems with 80% accuracy provided MIN A.   2) Interventions:  SLP Cognitive Skill Development     Outcome: MET Date Met: 04/10/19    Goal: STG-Within one week, patient will  1) Individualized goal:  Complete complex attention tasks with 80% accuracy provided MOD cues.   2) Interventions:  SLP Cognitive Skill Development and SLP Group Treatment      Outcome: MET Date Met: 04/10/19      Problem: Memory STGs  Goal: STG-Within one week, patient will  1) Individualized goal:  Recall daily therapy activities provided external aids (I.e. Therapy schedule, daily journal) in 4/5 trials.   2) Interventions:  SLP Cognitive Skill Development     Outcome: MET Date Met: 04/10/19         Negative

## 2023-05-22 DIAGNOSIS — E78.5 HYPERLIPIDEMIA, UNSPECIFIED: ICD-10-CM

## 2023-05-22 DIAGNOSIS — I10 ESSENTIAL (PRIMARY) HYPERTENSION: ICD-10-CM

## 2023-05-22 DIAGNOSIS — M54.9 DORSALGIA, UNSPECIFIED: ICD-10-CM

## 2023-05-22 DIAGNOSIS — J06.9 ACUTE UPPER RESPIRATORY INFECTION, UNSPECIFIED: ICD-10-CM

## 2023-05-22 DIAGNOSIS — R51.9 HEADACHE, UNSPECIFIED: ICD-10-CM

## 2023-05-22 DIAGNOSIS — Z20.822 CONTACT WITH AND (SUSPECTED) EXPOSURE TO COVID-19: ICD-10-CM

## 2023-05-22 DIAGNOSIS — G89.29 OTHER CHRONIC PAIN: ICD-10-CM

## 2023-08-04 ENCOUNTER — EMERGENCY (EMERGENCY)
Facility: HOSPITAL | Age: 43
LOS: 0 days | Discharge: HOME | End: 2023-08-05
Attending: EMERGENCY MEDICINE
Payer: SELF-PAY

## 2023-08-04 VITALS
SYSTOLIC BLOOD PRESSURE: 146 MMHG | OXYGEN SATURATION: 100 % | RESPIRATION RATE: 20 BRPM | DIASTOLIC BLOOD PRESSURE: 94 MMHG | WEIGHT: 149.91 LBS | HEART RATE: 61 BPM | TEMPERATURE: 98 F

## 2023-08-04 DIAGNOSIS — M54.9 DORSALGIA, UNSPECIFIED: ICD-10-CM

## 2023-08-04 DIAGNOSIS — R51.9 HEADACHE, UNSPECIFIED: ICD-10-CM

## 2023-08-04 PROCEDURE — 99284 EMERGENCY DEPT VISIT MOD MDM: CPT

## 2023-08-04 PROCEDURE — 99283 EMERGENCY DEPT VISIT LOW MDM: CPT

## 2023-08-04 RX ORDER — IBUPROFEN 200 MG
600 TABLET ORAL ONCE
Refills: 0 | Status: COMPLETED | OUTPATIENT
Start: 2023-08-04 | End: 2023-08-04

## 2023-08-04 RX ADMIN — Medication 600 MILLIGRAM(S): at 22:32

## 2023-08-05 NOTE — ED PROVIDER NOTE - NSFOLLOWUPCLINICSTOKEN_GEN_ALL_ED_FT
200886:7-10 Days|| ||00\01||False; 752727:7-10 Days|| ||00\01||False; 722871:7-10 Days|| ||00\01||False;

## 2023-08-05 NOTE — ED PROVIDER NOTE - NSFOLLOWUPCLINICS_GEN_ALL_ED_FT
[facility] ([facility])  Rehabilitation  [facility] [place] [address], [address] [place] [address]  Phone: [phone]  Fax:   Follow Up Time: 7-10 Days     [facility] ([facility])  Rehabilitation  [facility] [place] [address], [address] [place] [address]  Phone: [phone]  Fax:   Follow Up Time: 7-10 Days     [facility] ([facility])  Rehabilitation  [facility] [place] [address], [address] [place] [address]  Phone: [phone]  Fax:   Follow Up Time: 7-10 Days

## 2023-08-05 NOTE — ED PROVIDER NOTE - OBJECTIVE STATEMENT
43-year-old male presents with complaints of headache and back pain.  States that his symptoms have been ongoing for "the past 2 years".  Denies any changes to his symptoms, no trauma, no fever, no urinary complaints.

## 2023-08-05 NOTE — ED PROVIDER NOTE - CLINICAL SUMMARY MEDICAL DECISION MAKING FREE TEXT BOX
Patient presented for evaluation of headache and back pain.  States that the symptoms are mild.  Was treated with analgesic medication and will be discharged outpatient follow-up.

## 2023-08-05 NOTE — ED PROVIDER NOTE - PATIENT PORTAL LINK FT
You can access the FollowMyHealth Patient Portal offered by [facility] by registering at the following website: [email or web address]. By joining [employer]’s FollowMyHealth portal, you will also be able to view your health information using other applications (apps) compatible with our system. You can access the FollowMyHealth Patient Portal offered by [facility] by registering at the following website: [email or web address]. By joining [employer]’s FollowMyHealth portal, you will also be able to view your health information using other applications (apps) compatible with our system. You can access the FollowMyHealth Patient Portal offered by [facility] by registering at the following website: [email or web address]. By joining [employer]’s FollowMyHealth portal, you will also be able to view your health information using other applications (apps) compatible with our system.

## 2023-08-05 NOTE — ED PROVIDER NOTE - NSFOLLOWUPINSTRUCTIONS_ED_ALL_ED_FT
Follow up with physical rehab within 1 week    Back Pain    Back pain is very common in adults. The cause of back pain is rarely dangerous and the pain often gets better over time. The cause of your back pain may not be known and may include strain of muscles or ligaments, degeneration of the spinal disks, or arthritis. Occasionally the pain may radiate down your leg(s). Over-the-counter medicines to reduce pain and inflammation are often the most helpful. Stretching and remaining active frequently helps the healing process.     SEEK IMMEDIATE MEDICAL CARE IF YOU HAVE ANY OF THE FOLLOWING SYMPTOMS: bowel or bladder control problems, unusual weakness or numbness in your arms or legs, nausea or vomiting, abdominal pain, fever, dizziness/lightheadedness.

## 2023-08-07 ENCOUNTER — EMERGENCY (EMERGENCY)
Facility: HOSPITAL | Age: 43
LOS: 0 days | Discharge: HOME | End: 2023-08-07
Attending: EMERGENCY MEDICINE
Payer: SELF-PAY

## 2023-08-07 VITALS
WEIGHT: 149.91 LBS | TEMPERATURE: 99 F | OXYGEN SATURATION: 97 % | SYSTOLIC BLOOD PRESSURE: 122 MMHG | DIASTOLIC BLOOD PRESSURE: 88 MMHG | HEART RATE: 87 BPM | RESPIRATION RATE: 20 BRPM

## 2023-08-07 PROCEDURE — 99284 EMERGENCY DEPT VISIT MOD MDM: CPT

## 2023-08-07 PROCEDURE — 99283 EMERGENCY DEPT VISIT LOW MDM: CPT

## 2023-08-07 RX ORDER — IBUPROFEN 200 MG
600 TABLET ORAL ONCE
Refills: 0 | Status: COMPLETED | OUTPATIENT
Start: 2023-08-07 | End: 2023-08-07

## 2023-08-07 RX ORDER — ACETAMINOPHEN 500 MG
975 TABLET ORAL ONCE
Refills: 0 | Status: COMPLETED | OUTPATIENT
Start: 2023-08-07 | End: 2023-08-07

## 2023-08-07 RX ADMIN — Medication 975 MILLIGRAM(S): at 17:04

## 2023-08-07 RX ADMIN — Medication 600 MILLIGRAM(S): at 17:04

## 2023-08-07 NOTE — ED PROVIDER NOTE - ATTENDING CONTRIBUTION TO CARE
43-year-old male undomiciled presents with multiple chronic complaints requesting food, exam as above, 8, will discharge

## 2023-08-07 NOTE — ED PROVIDER NOTE - NSFOLLOWUPINSTRUCTIONS_ED_ALL_ED_FT
Headache    A headache is pain or discomfort felt around the head or neck area. The specific cause of a headache may not be found as there are many types including tension headaches, migraine headaches, and cluster headaches. Watch your condition for any changes. Things you can do to manage your pain include taking over the counter and prescription medications as instructed by your health care provider, lying down in a dark quiet room, limiting stress, getting regular sleep, and refraining from alcohol and tobacco products.    SEEK IMMEDIATE MEDICAL CARE IF YOU HAVE ANY OF THE FOLLOWING SYMPTOMS: fever, vomiting, stiff neck, loss of vision, problems with speech, muscle weakness, loss of balance, trouble walking, passing out, or confusion.      Back Pain    Back pain is very common in adults. The cause of back pain is rarely dangerous and the pain often gets better over time. The cause of your back pain may not be known and may include strain of muscles or ligaments, degeneration of the spinal disks, or arthritis. Occasionally the pain may radiate down your leg(s). Over-the-counter medicines to reduce pain and inflammation are often the most helpful. Stretching and remaining active frequently helps the healing process.     SEEK IMMEDIATE MEDICAL CARE IF YOU HAVE ANY OF THE FOLLOWING SYMPTOMS: bowel or bladder control problems, unusual weakness or numbness in your arms or legs, nausea or vomiting, abdominal pain, fever, dizziness/lightheadedness.

## 2023-08-07 NOTE — ED PROVIDER NOTE - PROVIDER TOKENS
PROVIDER:[TOKEN:[[id number]],FOLLOWUP:[1-3 Days]] PROVIDER:[TOKEN:[[id number]],FOLLOWUP:[1-3 Days]] PROVIDER:[TOKEN:[[id number]],FOLLOWUP:[1-3 Days]]

## 2023-08-07 NOTE — ED PROVIDER NOTE - NSFOLLOWUPCLINICSTOKEN_GEN_ALL_ED_FT
359026:1-3 Days|| ||00\01||False; 396383:1-3 Days|| ||00\01||False; 870577:1-3 Days|| ||00\01||False;

## 2023-08-07 NOTE — ED PROVIDER NOTE - PATIENT PORTAL LINK FT
You can access the FollowMyHealth Patient Portal offered by [facility] by registering at the following website: [email or web address]. By joining [employer]’s FollowMyHealth portal, you will also be able to view your health information using other applications (apps) compatible with our system. You can access the FollowMyHealth Patient Portal offered by [facility] by registering at the following website: [email or web address]. By joining [employer]’s FollowMyHealth portal, you will also be able to view your health information using other applications (apps) compatible with our system. You can access the FollowMyHealth Patient Portal offered by [facility] by registering at the following website: [email or web address]. By joining [employer]’s FollowMyHealth portal, you will also be able to view your health information using other applications (apps) compatible with our system.

## 2023-08-07 NOTE — ED ADULT NURSE NOTE - NSFALLUNIVINTERV_ED_ALL_ED
Bed/Stretcher in lowest position, wheels locked, appropriate side rails in place/Call bell, personal items and telephone in reach/Instruct patient to call for assistance before getting out of bed/chair/stretcher/Non-slip footwear applied when patient is off stretcher/[place] to call system/Physically safe environment - no spills, clutter or unnecessary equipment/Purposeful proactive rounding/Room/bathroom lighting operational, light cord in reach Bed/Stretcher in lowest position, wheels locked, appropriate side rails in place/Call bell, personal items and telephone in reach/Instruct patient to call for assistance before getting out of bed/chair/stretcher/Non-slip footwear applied when patient is off stretcher/[place] to call system/Physically safe environment - no spills, clutter or unnecessary equipment/Purposeful proactive rounding/Room/bathroom lighting operational, light cord in reach Bed/Stretcher in lowest position, wheels locked, appropriate side rails in place/Call bell, personal items and telephone in reach/Instruct patient to call for assistance before getting out of bed/chair/stretcher/Non-slip footwear applied when patient is off stretcher/[place] to call system/Physically safe environment - no spills, clutter or unnecessary equipment/Purposeful proactive rounding/Room/bathroom lighting operational, light cord in reach

## 2023-08-07 NOTE — ED PROVIDER NOTE - NSFOLLOWUPCLINICS_GEN_ALL_ED_FT
[facility] ([facility])  Rehabilitation  [facility] [place] [address], [address] [place] [address]  Phone: [phone]  Fax:   Follow Up Time: 1-3 Days     [facility] ([facility])  Rehabilitation  [facility] [place] [address], [address] [place] [address]  Phone: [phone]  Fax:   Follow Up Time: 1-3 Days     [facility] ([facility])  Rehabilitation  [facility] [place] [address], [address] [place] [address]  Phone: [phone]  Fax:   Follow Up Time: 1-3 Days

## 2023-08-07 NOTE — ED PROVIDER NOTE - CARE PROVIDER_API CALL
Seamus Short  Neurology  [address] [place] [address]  Phone: [phone]  Fax: [phone]  Follow Up Time: 1-3 Days   Seamus Short  Neurology  [address] [place] [address]  Phone: [phone]  Fax: [phone]  Follow Up Time: 1-3 Days   Seamus Short  Neurology  [address] [place] [address]  Phone: [phone]  Fax: [phone]  Follow Up Time: 1-3 Days

## 2023-08-07 NOTE — ED PROVIDER NOTE - OBJECTIVE STATEMENT
43-year-old male w PMHx of HTN (noncompliant w meds) presents with complaints of headache and back pain for the past 2 years. Pt states his sx are unchanged from usual HA and back pain. Denies fevers chills neck pain chest pain shortness of breath nausea vomiting diarrhea abdominal pain syncope dizziness lightheadedness.  Patient did not take any medicine prior to arrival.  Patient is also requesting food.
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
## 2023-08-09 DIAGNOSIS — I10 ESSENTIAL (PRIMARY) HYPERTENSION: ICD-10-CM

## 2023-08-09 DIAGNOSIS — G44.209 TENSION-TYPE HEADACHE, UNSPECIFIED, NOT INTRACTABLE: ICD-10-CM

## 2023-08-09 DIAGNOSIS — M54.9 DORSALGIA, UNSPECIFIED: ICD-10-CM

## 2023-08-09 DIAGNOSIS — Z59.00 HOMELESSNESS UNSPECIFIED: ICD-10-CM

## 2023-08-09 DIAGNOSIS — T73.0XXA STARVATION, INITIAL ENCOUNTER: ICD-10-CM

## 2023-08-09 DIAGNOSIS — R51.9 HEADACHE, UNSPECIFIED: ICD-10-CM
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
## 2023-08-09 SDOH — ECONOMIC STABILITY - HOUSING INSECURITY: HOMELESSNESS UNSPECIFIED: Z59.00

## 2023-08-10 ENCOUNTER — EMERGENCY (EMERGENCY)
Facility: HOSPITAL | Age: 43
LOS: 1 days | Discharge: HOME | End: 2023-08-10
Admitting: STUDENT IN AN ORGANIZED HEALTH CARE EDUCATION/TRAINING PROGRAM
Payer: SELF-PAY

## 2023-08-10 VITALS
TEMPERATURE: 98 F | HEART RATE: 99 BPM | RESPIRATION RATE: 16 BRPM | SYSTOLIC BLOOD PRESSURE: 124 MMHG | OXYGEN SATURATION: 99 % | DIASTOLIC BLOOD PRESSURE: 76 MMHG

## 2023-08-10 DIAGNOSIS — M54.9 DORSALGIA, UNSPECIFIED: ICD-10-CM

## 2023-08-10 DIAGNOSIS — I10 ESSENTIAL (PRIMARY) HYPERTENSION: ICD-10-CM

## 2023-08-10 DIAGNOSIS — G89.29 OTHER CHRONIC PAIN: ICD-10-CM

## 2023-08-10 DIAGNOSIS — M54.50 LOW BACK PAIN, UNSPECIFIED: ICD-10-CM

## 2023-08-10 DIAGNOSIS — R51.9 HEADACHE, UNSPECIFIED: ICD-10-CM

## 2023-08-10 PROCEDURE — 99284 EMERGENCY DEPT VISIT MOD MDM: CPT

## 2023-08-10 PROCEDURE — 99283 EMERGENCY DEPT VISIT LOW MDM: CPT

## 2023-08-10 RX ORDER — IBUPROFEN 200 MG
600 TABLET ORAL ONCE
Refills: 0 | Status: COMPLETED | OUTPATIENT
Start: 2023-08-10 | End: 2023-08-10

## 2023-08-10 RX ADMIN — Medication 600 MILLIGRAM(S): at 16:25

## 2023-08-10 NOTE — ED ADULT NURSE NOTE - NEURO SENSATION
[address] Patient Status:  Hospital Outpatient Surgery   Age/Gender 47year old male MRN [id number]   Location [address] [place] [address] Attending Russell Saez MD   Hosp Day # 0 PCP Anton Biswas MD       Anesthesia Post-op Note    COLONOSCOPY with cold snare polypectomy x4    Procedure Summary     Date: 10/24/22 Room / Location: [facility] ENDOSCOPY 03 / 1404 [facility] ENDOSCOPY    Anesthesia Start: 4612 Anesthesia Stop: 1430    Procedure: COLONOSCOPY with cold snare polypectomy x4 (N/A ) Diagnosis: (Polyps, diverticulosis )    Surgeons: Russell Saez MD Anesthesiologist: Natasha Adams MD    Anesthesia Type: MAC ASA Status: 2          Anesthesia Type: No value filed. Vitals Value Taken Time   /70 10/24/22 1435   Temp 98 10/24/22 1435   Pulse 60 10/24/22 1434   Resp 16 10/24/22 1435   SpO2 98 % 10/24/22 1434   Vitals shown include unvalidated device data. Patient Location: Endoscopy    Anesthesia Type: MAC    Airway Patency: patent    Postop Pain Control: adequate    Mental Status: preanesthetic baseline    Nausea/Vomiting: none    Cardiopulmonary/Hydration status: stable euvolemic    Complications: no apparent anesthesia related complications    Postop vital signs: stable    Dental Exam: Unchanged from Preop    Patient to be discharged home when criteria met. sensory intact

## 2023-08-10 NOTE — ED PROVIDER NOTE - PHYSICAL EXAMINATION
VITAL SIGNS: I have reviewed nursing notes and confirm.  CONSTITUTIONAL: Well-developed; in no acute distress.   SKIN:  warm and dry, no acute rash.   HEAD:  normocephalic, atraumatic.  EYES: PERRL, EOM intact; conjunctiva and sclera clear.  ENT: No nasal discharge; airway clear.   NECK: Supple; non tender.  BACK: No midline spinal tenderness. Mild ttp bilateral lumbar paraspinal muscles.   CARD: S1, S2 normal; no murmurs, gallops, or rubs. Regular rate and rhythm.   RESP:  Clear to auscultation b/l, no wheezes, rales or rhonchi.  ABD: Normal bowel sounds; soft; non-distended; non-tender; no guarding/ rebound.  EXT: Normal ROM. No clubbing, cyanosis or edema. 2+ pulses to b/l ue/le.  NEURO: Alert, oriented, grossly unremarkable. CN grossly intact. 5/5 strength in all extremities, Sensation equal and intact.   PSYCH: Cooperative, mood and affect appropriate.

## 2023-08-10 NOTE — ED ADULT NURSE NOTE - NSFALLUNIVINTERV_ED_ALL_ED
Bed/Stretcher in lowest position, wheels locked, appropriate side rails in place/Call bell, personal items and telephone in reach/Instruct patient to call for assistance before getting out of bed/chair/stretcher/Non-slip footwear applied when patient is off stretcher/[place] to call system/Physically safe environment - no spills, clutter or unnecessary equipment/Purposeful proactive rounding/Room/bathroom lighting operational, light cord in reach

## 2023-08-10 NOTE — ED PROVIDER NOTE - PATIENT PORTAL LINK FT
You can access the FollowMyHealth Patient Portal offered by [facility] by registering at the following website: [email or web address]. By joining [employer]’s FollowMyHealth portal, you will also be able to view your health information using other applications (apps) compatible with our system.

## 2023-08-10 NOTE — ED PROVIDER NOTE - OBJECTIVE STATEMENT
42yo male with pmhx of HTN presents with back pain and headache x years. Pt denies acute change in symptoms today. States he is seen frequently at the hospital for this complaint, most recently 1wk ago. He denies vision changes, vomiting, numbness, weakness, incontinence, saddle anesthesia.

## 2023-08-10 NOTE — ED PROVIDER NOTE - CLINICAL SUMMARY MEDICAL DECISION MAKING FREE TEXT BOX
He is afebrile and hemodynamically stable. He reports chronic headache and back pain without acute change today. Denies new injury or trauma. He has no midline spinal tenderness, red flag back pain symptoms or focal neurologic deficits. CN grossly intact. Low suspicion for acute pathology. Pt requesting food which was provided. Given ibuprofen 600mg. Will provide with information for pmd. Return precautions given.

## 2023-08-10 NOTE — ED ADULT NURSE NOTE - OBJECTIVE STATEMENT
Pt A&Ox4 presents to ED with headache and lower back pain x years. Pt reports, "I get pain meds for it at the hospital." Pt does not have PCP. Denies chest pain, shortness of breath, nausea/vomiting, dizziness, changes in vision. Ambulates with steady gait.

## 2023-08-10 NOTE — ED PROVIDER NOTE - NS ED ROS FT
Constitutional: No fever. No chills.  Eyes: No redness. No discharge. No vision change.   ENT: No sore throat. No ear pain.  Cardiovascular: No chest pain. No leg swelling.  Respiratory: No cough. No shortness of breath.  GI: No abdominal pain. No vomiting. No diarrhea.   MSK: No joint pain. +back pain.   Skin: No rash. No abrasions.   Neuro: No numbness. No weakness. +headache.   Psych: No known mental health issues.

## 2023-08-10 NOTE — ED PROVIDER NOTE - NSFOLLOWUPINSTRUCTIONS_ED_ALL_ED_FT
Call the number above to follow up or if your insurance is not accepted, call one of the numbers below for an appointment.    [facility] - [email or web address]  * Most health insurance plans accepted / sliding fee scale for the uninsured  - [address], [address] [facility] [id number] [phone] - [address], [place] [address] [phone]  - The facility offers the following services - medical care, dental, neuropsychology, speech therapy, physical therapy, addiction recovery, psychiatry, podiatry, social work, occupational therapy, STD/HIV screening and treatment    [facility] - [email or web address]  *Care for the whole family. We never turn anyone away.  - [facility] [address], [place], [place] [address] [phone]  - [facility] [address] [place] [address] [phone]  - [facility] [address] [place], [place] [address] [phone]    [facility]  - [facility] and [address] [place] [address].  - You may seek assistance with your Medicaid coverage in their Medicaid Office and you may obtain a Clinic Card which will give you access to [facility] primary care and specialty clinics.  - You can make those appointments on site or call [phone] to make an appointment.    CRISIS INTERVENTION  The [facility] Crisis Intervention Program provides same-day, walk-in help for [facility] who are homeless or at risk of homelessness. No appointment necessary. Our counselors and advocates are available Monday through Friday at 9am, with sign-up starting earlier. We provide assistance and advocacy with problems at shelters, intake centers (PATH, AFIC, etc), benefits (Welfare, Food [place], SSI/SSD, Medicaid, etc), eviction prevention, establishing a mailing address, and applications for housing assistance. We also provide referrals to shelter, clothing, food pantries, legal services, counseling, detox and addiction treatment, job training, mental health services, domestic violence counseling, assistance with ID, and other services.      [facility]  [address] [place] [address], located in [facility]  Mon, Tues, & Thurs: We see the first 50 clients  Wed & Fri: We see the first 30 clients  Return carfare provided.  Directions: A, C, 2, 3, 4, 5, J, M or Z train to [facility]      STREET OUTREACH - [address] [place] [address] partners with nonprofits to provide outreach, offer support services, and encourage people living on the streets to enter into shelter. If you are street homeless or you know the location of someone who might benefit from a street outreach team, please call 311.      [facility]   • [facility] • [address] • [phone] • Sun, M, W: 7-8:30am; M-F: 5:30-7pm; Sat: 9:30-11am • [address] to [address] • No referral needed  • [facility] • [address] • [phone] • M-Sun: 6:30-7am; 5-6:30pm  • [address] to [address] • No referral needed  • [facility] • [address] • [phone] • Th & F: 6-7:30am • [address] to [address] • No referral needed  • [facility] • [address] • [phone] • Daily: 6:30-7:30am;12:30-1:30pm; 4:30-6pm • 6 to [address] • No referral needed  • [facility] • [address] • [phone] • M-F: 10:30am-12:30pm • Counseling 9:30am-12:30pm • C/E to [address] • No referral needed  • [facility] • [address] ([address] & [address]) • [phone] • Tu-F: 12-2pm • Sometimes pantry is available • F to [address], [address] to [address], N/R to [address] • Women only  • [facility] • [address] ([address]) • [phone] • M-Sun: 7am (show up at 6:30am) • B/D/F/M/N/Q/R to [address] • No referral needed  • [facility] • [address] • [phone] • M-F: 10-11:30am • F to [address], [address] to [address] • No referral needed  • [facility] • [address] ([address] & [address]) • [phone] • Tu & Th: 1-2pm • A/C/E to [address], N/R to [address] • No referral needed  • [facility] • [address] • [phone] • M, W, F: 7:30-9am • C to [address] • No referral needed  • [place] [facility] • [address] • [phone] • M-F: 8-9:15am; M, W, F: 4:30-6pm; Bags on Sat, Sun, Holidays 4-5pm • A/C/2/3 to [facility]/[address] or [address] to [address] • No referral needed    [facility]   • [facility] [facility] • [address] • [phone] • M, W, F: 5-6:30pm • [address] to [address]• No referral needed  • [facility] • [address] & [place] • [phone] • M, W, F: 1-2pm • 2/4 to [address] • No referral needed  • [facility] • [address] • [phone] • Tu & F: 11am-1pm • B/D to [address] • No referral needed  • [facility] • [address] • [phone] • Every day: 12:30-3:30pm, Pantry: 9am-12pm • D to [facility]/[place], 4 to [facility] •   • [facility] • [address] (bt[address]) • [phone] • M-F: 9am-11am; 11:30am-1pm; F: 1-3:30pm; Sandwiches M-F: 1-3pm; Sat: 11:30am-1:30pm • R to [address] • No referral needed  • [facility]: Dashawn Kirkland • [address] • [phone] • Sun-Th: 4-8:30pm • F to [address] • Kosher meals • No referral needed for first meal  • [facility]: [place] • [address] • [phone] • Sun-Th: 4-8:30pm • B/Q to [address] • Kosher meals • No referral needed for first meal  • [facility] • [address] • [phone] • M-F: 8-9:30am, 10:30am-12:30pm • J to [address] • No referral needed    [facility]   • Paulo Luc[facility] • [address] • [phone] • M-F: 11am-2pm • [address] to [address] • No referral needed  • [facility] • [address] • [phone] • Th: 12-1:30pm • F to [address] or Q110 • No referral needed      FOOD PANTRY    [place]   • [facility] • [address] • [phone] • Third Saturday of the month: 8am • [address] to [address] • ID Required  • [place] • [address] • [phone] • Sat: 10-11:30am • C/E to [address] • ID Required  • [facility] • [address] • [phone] • Th: 9-10am, Tu-F: 2-3pm (lunch bag) • A/C/E/1/2/3 to [address] • No referral needed  • [facility] • [address] • [phone] • Fri: 2-4pm • [address] to [address] • No referral needed   • [place] [facility] • [address] • [phone] • M-Sat: 9am-8pm; Sun: 4pm-8pm • 2/3 to [place]/[address] or 6 to [address] • Must register first with photo ID, proof of address and proof of income/benefits    [facility] [place]  • [facility] • [address] • [phone] • Tu: 1-2:30pm • 2/5 to [facility] [address] • No referral needed • Photo ID required    [place]   • [facility] • [address] ([address]) • [phone] • Fri: 11:30am-1pm • R to [address] • No referral needed  • [facility] • [address] • [phone] • Tu, Th: 10am-12:30pm • B/Q to [address] • No ID needed first time; ID required 2nd time    [facility]   • [facility] • [address] • [phone] • Wed: 8-10am • F to [address] or Q17 or Q111 to [address] • No referral needed  • [facility] • [address] • [phone] • Wed: 10am-12pm; Th: 11:30am-1:30pm • A to [place], Q111 to [address] • No referral needed • Bring photo ID      SHOWER, LAUNDRY & HAIRCUTS  • [facility] • [address] ([address] [place]) • M, W, F: 10:30am-1:30pm • [address] to [address] • Showers  • [facility] [employer] [employer]   • [facility] [address]: 8am-2pm (8-9am in summer) and 6-8:30pm   • [facility] [address]: 6:30am-9:30pm • [address]: 6:30am-9:30pm   • [facility] [place] [address]: M-F: 7am-9:30pm; Sat & Sun: 9am-4:30pm • Showers • Members only: $100/year for adults 25-61, $25/year for adults  18-24 or 62 and older  • [facility] • [address] ([address]) • [phone] • Tu-F: Line up at noon • F to [address], [address] to [address], N/R to [address] • Women & Children Only • Showers  • [facility] • [address] • [phone] • M-F: 8:30-10:30am • B/D to [facility], 4 to [place] • Showers  • [place] [facility] • [address] • [phone] • Showers: M-F: 8:30am-1pm • Laundry (2lbs max): M-W: 9am-1pm • Free haircuts: Wed: 9:30am-12:30pm • 2/3 to [place]/[address] or 6 to [address] • Sign-up for shower/laundry must be done before 9:30am; sign-up for haircuts during breakfast, Wed: 8-9:15am    CLOTHING    [place]   • [place] [facility] • [address] ([facility]) • [phone] • Mon (referral needed, interview clothing only): 11:30am • Tu & F (no referral needed): 11:30am • [address] to [address], J to [place] • Men Only  • [facility] • [address] ([address] & [address]nd) • [phone] • Tu-F: 12-2pm • F to [address], 6 to [address], N/R to [address] • No referral needed • Call beforehand  • [facility] • [address] • [phone] • M, W, F: 2:30-4pm • [address] to [address] • Men & Women  • [facility] • [address] ([address]) • [phone] • 1st Mon of each month: Oct-Dec; Feb-June, 11:30am-1pm • A/C/E to [address], N/R to [address] • Referral required     • [facility] • [address] • [phone] • Tu: 10am • C to [address] • No referral needed (Referral required for business clothes)    [facility]   • [facility] • [address] • [phone] • Fri & Sat: 11am-3pm • B/D to [facility] • Clothes sold at nominal prices  • [facility] • [address] • [phone] • M, W, F: 10:45-11:45am • B/D to [facility] [address], 4 to [place] • Line forms 7-8am    [place]   • [place] [facility] • [address] • [phone] • M, W, Th: 10am-12pm • J/M to [address] • Photo ID & referral needed • Call beforehand    [facility]   • [facility] • [address] • [phone] • Every second Wed: 11am-4pm • Q03 to [address] • No referral needed   • [facility] • [address] • [phone] • 10-10:30am • 7 to [facility] • No referral needed      LEGAL SERVICES   • [facility] • [address], [place] • [phone] • M, Th, F: 9am-3pm • B/D to [facility] [address], 4 to [place] • Assistance with public benefits, eviction prevention, immigration    DROP-IN CENTERS    [place]   • [facility] • [address] •[phone] • Open daily • Women admitted 3-5pm • Men admitted 4-5:30pm • [address] to [address]  • [facility] • [address] • [phone] • Open 24 hours • [address] to [address] • Men & Women    [facility] [place]  • [facility] • [address] • [phone] • Open 24 hours • 6 to [place], BX6 to [place] [address] • Men & Women, 25 & older     RUSS   • [facility] • [address] • [phone]     SHELTER FOR SINGLE ADULTS    MEN  • [address] • [address] • [phone] • Intake is open 24 hours • 6 to [address] • Men only    WOMEN  • [facility] • [address] • [phone] • Intake is open 24 hours • C to [address] • Women only  • [facility] [facility] • [address] • [phone] • Intake is open 24 hours • 2/5 to [address] & [address], transfer to Bx55/Bx15 to [address] • Women only    SHELTER FOR COUPLES   • Adult Family Intake Center (AFIC) • [address] ([address]) • [phone] • Open 24 hours • 6 to [address] • Application office for adult couples WITHOUT minor children    SHELTER FOR FAMILIES   • PATH • [address] ([place]) • [phone] • Open 24 hours • 2/4 to [address]/[place] • Must be a family with children under 21 years old or a pregnant family    YOUTH SHELTERS AND SERVICES   • [facility] • [address] • [phone] • M, Tu, Th & F: 12pm-5pm; Wed, Sat & Sun: Emergencies only • 2/3/A/B/C/D to [address] • Up to age 24  • [facility] • [address] • [phone] • Tu, Th, F: 1-5pm  • J/M/Z to [address], F to [address] • Up to age 24  • [facility] • [address] • [phone] • Intake: M, Tu, Th, F: 8am or 2pm; Wed: 5pm; Sat & Sun: 1pm • A/C/B/D to [address] • LGBTQ youth ages 16-24  • [facility] [phone]  • Streetwork Hotline [phone]) 389-1410  • Runaway Hotline (670) 252-7896  • Teen Crisis Hotline (440) 649-6526  • Teen Pregnancy Hotline (617) 125-1430    EMERGENCY NUMBERS   • AIDS Hotline (884) 461-3010  • Alcoholics Anonymous (044) 697-6687  • Bias Hotline (903) 279-9776  • Child Abuse Hotline (454) 050-8744  • Coalition for the Homeless (066) 054-4492  • Crime Victim Hotline (488) 547-4688  • Disaster Distress Helpline (703) 801-8439  • Domestic Violence Hotline (226) 418-2379  • Drug and Alcohol Hotline (076) 490-9279  • Elderly Crime Victim's Resource Center       (484) 840-8595  • Employment Information (552) 241-3967  • Eviction Prevention Hotline (012) 408-7417  • Food and Hunger Hotline (567) 391-6441  • Food [place] - English & Green[place]ic (368) 321-6039  • [employer] (781) 286-8596  • Homeless Services Hotline (683) 249-1458  • Immigration Hotline (957) 723-9225  •  Society (271) 354-0666  • Mental Health Services (320) 992-7941  • Narcotics Anonymous (750) 630-9344  • [place] Housing [employer] Application (295) 308-2484  • Poison Control Center (689) 233-2904 or       (218) 994-1756  • Police Sex Crime Unit (688) 618-7725  • [employer] [facility] (728) 766-6496  • Rape Crisis Hotline (081) 420-4937  • Senior Hotline (586) 200-3114  • Social Security Administration (353) 078-1616  • Suicide Hotline (232) 036-3976  • Traveler's Aid (430) 193-8830  • Veterans Crisis Line (800) 273-8255 x1

## 2023-09-11 NOTE — ED PROVIDER NOTE - NS ED MD DISPO DISCHARGE CCDA
Goal Outcome Evaluation:  Pt afebrile, vitals stable. Pt has been sleeping between cares. Pt abd obese, +bs, +stool watery brown liquid from ileostomy, +gas. Ileostomy intact. Takedown site drsg D/I., SHAY site drsg D/I. No drge noted on any drsg.  Pt rated her pain a 6-8 tonight. Described it as sharp. Pain managed with prn oxycodone 15 mg q 4hr, prn atarax and robaxin as well as scheduled tylenol. Cosigner RN present when administering narcotics to patient due to recent history of pt questioning if prn pain medications were administered by the nursing staff. The behavior was not witnessed by this RN. Pt was appropriate and appreciative of cares done. Pt was able to sleep between cares. Pt UAL to bathroom with SBA. Pt managing her own ileostomy tonight. Cont to maintain pain control. Offer non pharmaceutical means to help decrease pain.                          Patient/Caregiver provided printed discharge information.

## 2023-09-28 NOTE — ED ADULT NURSE NOTE - LOCATION
back Bilateral Rotation Flap Text: The defect edges were debeveled with a #15 scalpel blade. Given the location of the defect, shape of the defect and the proximity to free margins a bilateral rotation flap was deemed most appropriate. Using a sterile surgical marker, an appropriate rotation flap was drawn incorporating the defect and placing the expected incisions within the relaxed skin tension lines where possible. The area thus outlined was incised deep to adipose tissue with a #15 scalpel blade. The skin margins were undermined to an appropriate distance in all directions utilizing iris scissors. Following this, the designed flap was carried over into the primary defect and sutured into place.

## 2023-10-16 ENCOUNTER — EMERGENCY (EMERGENCY)
Facility: HOSPITAL | Age: 43
LOS: 1 days | Discharge: HOME | End: 2023-10-16
Admitting: STUDENT IN AN ORGANIZED HEALTH CARE EDUCATION/TRAINING PROGRAM
Payer: MEDICAID

## 2023-10-16 VITALS
HEIGHT: 71 IN | SYSTOLIC BLOOD PRESSURE: 131 MMHG | RESPIRATION RATE: 18 BRPM | OXYGEN SATURATION: 100 % | WEIGHT: 160.06 LBS | DIASTOLIC BLOOD PRESSURE: 94 MMHG | HEART RATE: 60 BPM

## 2023-10-16 PROCEDURE — 99283 EMERGENCY DEPT VISIT LOW MDM: CPT

## 2023-10-16 PROCEDURE — 99284 EMERGENCY DEPT VISIT MOD MDM: CPT

## 2023-10-16 RX ORDER — IBUPROFEN 200 MG
600 TABLET ORAL ONCE
Refills: 0 | Status: COMPLETED | OUTPATIENT
Start: 2023-10-16 | End: 2023-10-16

## 2023-10-16 RX ADMIN — Medication 600 MILLIGRAM(S): at 15:57

## 2023-10-16 NOTE — ED ADULT NURSE NOTE - OBJECTIVE STATEMENT
Pt is 43M c/o chronic back pain. Denies saddle paresthesias, loss of bowel/bladder control, recent injury/trauma. Pt ambulatory independently with steady gait.

## 2023-10-16 NOTE — ED ADULT NURSE NOTE - NSFALLUNIVINTERV_ED_ALL_ED
Bed/Stretcher in lowest position, wheels locked, appropriate side rails in place/Call bell, personal items and telephone in reach/Instruct patient to call for assistance before getting out of bed/chair/stretcher/Non-slip footwear applied when patient is off stretcher/[place] to call system/Physically safe environment - no spills, clutter or unnecessary equipment/Purposeful proactive rounding/Room/bathroom lighting operational, light cord in reach

## 2023-10-16 NOTE — ED PROVIDER NOTE - CLINICAL SUMMARY MEDICAL DECISION MAKING FREE TEXT BOX
42 y/o m presents c/o low back pain, headache.  Pt ambulatory, no neuro deficits, asking for sandwich, ibuprofen which was given, no red flags on exam, will dc

## 2023-10-16 NOTE — ED PROVIDER NOTE - PATIENT PORTAL LINK FT
You can access the FollowMyHealth Patient Portal offered by [facility] by registering at the following website: [email or web address]. By joining [email or web address]’s FollowMyHealth portal, you will also be able to view your health information using other applications (apps) compatible with our system.

## 2023-10-16 NOTE — ED PROVIDER NOTE - NSFOLLOWUPINSTRUCTIONS_ED_ALL_ED_FT
Chronic Back Pain  Chronic back pain is back pain that lasts longer than 3 months. The pain may get worse at certain times (flare-ups). There are things you can do at home to manage your pain.    Follow these instructions at home:  Watch for any changes in your symptoms. Take these actions to help with your pain:    Managing pain and stiffness    A bag of ice on a towel on the skin.  A heating pad for use on the affected area.  If told, put ice on the painful area. You may be told to use ice for 24–48 hours after a flare-up starts.  Put ice in a plastic bag.  Place a towel between your skin and the bag.  Leave the ice on for 20 minutes, 2–3 times a day.  If told, put heat on the painful area. Do this as often as told by your doctor. Use the heat source that your doctor recommends, such as a moist heat pack or a heating pad.  Place a towel between your skin and the heat source.  Leave the heat on for 20–30 minutes.  If your skin turns bright red, take off the ice or heat right away to prevent skin damage. The risk of damage is higher if you cannot feel pain, heat, or cold.  Soak in a warm bath. This can help with pain.  Activity    A person bending down and showing the correct posture to use when lifting a heavy object.  A person showing good posture while sitting at a desk and using a computer.  A person standing and ironing with one foot resting on a stable footstool to help keep the spine neutral.  Avoid bending and other activities that make the pain worse.  When you stand:  Keep your upper back and neck straight.  Keep your shoulders pulled back.  Avoid slouching.  When you sit:  Keep your back straight.  Relax your shoulders. Do not round your shoulders or pull them backward.  Do not sit or  one place for too long.  Take short rest breaks during the day. Lying down or standing is often better than sitting. Resting can help relieve pain.  When sitting or lying down for a long time, do some mild activity or stretching. This will help to prevent stiffness and pain.  Get regular exercise. Ask your doctor what activities are safe for you.  You may have to avoid lifting. Ask your provider how much you can safely lift.  If you lift things:  Bend your knees.  Keep the weight close to your body.  Avoid twisting.  Medicines    Take over-the-counter and prescription medicines only as told by your doctor.  You may need to take medicines for pain and swelling. These may be taken by mouth or put on the skin. You may also be given muscle relaxants.  Ask your doctor if the medicine prescribed to you:  Requires you to avoid driving or using machinery.  Can cause trouble pooping (constipation). You may need to take these actions to prevent or treat trouble pooping:  Drink enough fluid to keep your pee (urine) pale yellow.  Take over-the-counter or prescription medicines.  Eat foods that are high in fiber. These include beans, whole grains, and fresh fruits and vegetables.  Limit foods that are high in fat and sugars. These include fried or sweet foods.  General instructions    A person with one pillow sleeping on their side using the correct posture, keeping the back straight, shown by dashed line.  Sleep on a firm mattress. Try lying on your side with your knees slightly bent. If you lie on your back, put a pillow under your knees.  Do not smoke or use any products that contain nicotine or tobacco. If you need help quitting, ask your doctor.  Contact a doctor if:  Your pain does not get better with rest or medicine.  You have new pain.  You have a fever.  You lose weight quickly.  You have trouble doing your normal activities.  One or both of your legs or feet feel weak.  One or both of your legs or feet lose feeling (have numbness).  Get help right away if:  You are not able to control when you pee or poop.  You have bad back pain and:  You feel like you may vomit (nauseous).  You vomit.  You have pain in your chest or your belly (abdomen).  You have shortness of breath.  You faint.  These symptoms may be an emergency. Get help right away. Call 911.  Do not wait to see if the symptoms will go away.  Do not drive yourself to the hospital.  This information is not intended to replace advice given to you by your health care provider. Make sure you discuss any questions you have with your health care provider.

## 2023-10-16 NOTE — ED ADULT TRIAGE NOTE - ARRIVAL FROM
[Alert] : alert [Well Nourished] : well nourished [Healthy Appearance] : healthy appearance [Normal Sclera/Conjunctiva] : normal sclera/conjunctiva [PERRL] : pupils equal, round and reactive to light [EOMI] : extra ocular movement intact [No Proptosis] : no proptosis [No Lid Lag] : no lid lag [Normal Outer Ear/Nose] : the ears and nose were normal in appearance Home [No Neck Mass] : no neck mass was observed [No LAD] : no lymphadenopathy [Thyroid Not Enlarged] : the thyroid was not enlarged [No Thyroid Nodules] : no palpable thyroid nodules [Clear to Auscultation] : lungs were clear to auscultation bilaterally [No Murmurs] : no murmurs [Normal Rate] : heart rate was normal [Regular Rhythm] : with a regular rhythm [Carotids Normal] : carotid pulses were normal with no bruits [No Edema] : no peripheral edema [Not Tender] : non-tender [Soft] : abdomen soft [No HSM] : no hepato-splenomegaly [Normal Supraclavicular Nodes] : no supraclavicular lymphadenopathy [Normal Anterior Cervical Nodes] : no anterior cervical lymphadenopathy [No CVA Tenderness] : no ~M costovertebral angle tenderness [Kyphosis] : no kyphosis present [Normal Gait] : normal gait [No Involuntary Movements] : no involuntary movements were seen [No Joint Swelling] : no joint swelling seen [Normal Strength/Tone] : muscle strength and tone were normal [No Rash] : no rash [Foot Ulcers] : no foot ulcers [Right Foot Was Examined] : right foot ~C was examined [Left Foot Was Examined] : left foot ~C was examined [Delayed in the Right Toes] : normal in the toes [Normal] : normal [Delayed in the Left Toes] : normal in the toes [2+] : 2+ in the dorsalis pedis [Vibration Dec.] : diminished vibratory sensation at the level of the toes [Position Sense Dec.] : normal position sense at the level of the toes [Diminished Throughout Both Feet] : normal tactile sensation with monofilament testing throughout both feet [No Tremors] : no tremors [Normal Sensation on Monofilament Testing] : normal sensation on monofilament testing of lower extremities [Normal Affect] : the affect was normal [Normal Mood] : the mood was normal [de-identified] : Mild corneal arcus, mostly the R eye [de-identified] : Hearing is only slightly impaired [de-identified] : DP pulses 3+ bilaterally [de-identified] : Areas of moderate lipohypertrophy on either side of the umbiicus

## 2023-10-16 NOTE — ED PROVIDER NOTE - OBJECTIVE STATEMENT
44 y/o m hx chronic lbp presents c/o typical low back pain, mild headache.  Pt stating he usually takes ibuprofen, didn't take anything today.  Denies fever, chills, trauma, dizziness, n/v, all other ROS negative.

## 2023-10-18 DIAGNOSIS — R51.9 HEADACHE, UNSPECIFIED: ICD-10-CM

## 2023-10-18 DIAGNOSIS — M54.50 LOW BACK PAIN, UNSPECIFIED: ICD-10-CM

## 2023-12-31 ENCOUNTER — EMERGENCY (EMERGENCY)
Facility: HOSPITAL | Age: 43
LOS: 1 days | Discharge: LEFT BEFORE DISCHARGE | End: 2023-12-31
Admitting: EMERGENCY MEDICINE
Payer: SELF-PAY

## 2023-12-31 VITALS
OXYGEN SATURATION: 97 % | HEART RATE: 81 BPM | TEMPERATURE: 98 F | DIASTOLIC BLOOD PRESSURE: 78 MMHG | RESPIRATION RATE: 18 BRPM | SYSTOLIC BLOOD PRESSURE: 137 MMHG

## 2023-12-31 PROBLEM — Z87.898 PERSONAL HISTORY OF OTHER SPECIFIED CONDITIONS: Chronic | Status: ACTIVE | Noted: 2022-11-14

## 2023-12-31 PROCEDURE — L9991: CPT

## 2023-12-31 NOTE — ED ADULT TRIAGE NOTE - CHIEF COMPLAINT QUOTE
bladder issue bladder issue, doesn't want to talk, wont converse at triage bladder issue, doesn't want to talk, wont converse at triage, wouldn't allow vital signs

## 2024-01-02 DIAGNOSIS — N32.9 BLADDER DISORDER, UNSPECIFIED: ICD-10-CM

## 2024-01-02 DIAGNOSIS — Z53.21 PROCEDURE AND TREATMENT NOT CARRIED OUT DUE TO PATIENT LEAVING PRIOR TO BEING SEEN BY HEALTH CARE PROVIDER: ICD-10-CM

## 2024-01-10 NOTE — ED ADULT NURSE NOTE - CHPI ED NUR SYMPTOMS NEG
Modify Regimen: Tretinoin 0.1% cream —> 0.05% cream QHS
Render In Strict Bullet Format?: No
no bladder dysfunction/no bowel dysfunction/no constipation/no difficulty bearing weight/no motor function loss/no neck tenderness/no numbness/no tingling
Detail Level: Zone
Initiate Treatment: Azaleic acid 15% gel

## 2024-03-26 ENCOUNTER — EMERGENCY (EMERGENCY)
Facility: HOSPITAL | Age: 44
LOS: 1 days | Discharge: HOME | End: 2024-03-26
Admitting: EMERGENCY MEDICINE
Payer: SELF-PAY

## 2024-03-26 VITALS
TEMPERATURE: 98 F | SYSTOLIC BLOOD PRESSURE: 153 MMHG | RESPIRATION RATE: 18 BRPM | WEIGHT: 139.99 LBS | HEART RATE: 81 BPM | OXYGEN SATURATION: 98 % | DIASTOLIC BLOOD PRESSURE: 107 MMHG

## 2024-03-26 PROCEDURE — 99283 EMERGENCY DEPT VISIT LOW MDM: CPT

## 2024-03-26 PROCEDURE — 99282 EMERGENCY DEPT VISIT SF MDM: CPT

## 2024-03-26 RX ORDER — IBUPROFEN 200 MG
400 TABLET ORAL ONCE
Refills: 0 | Status: COMPLETED | OUTPATIENT
Start: 2024-03-26 | End: 2024-03-26

## 2024-03-26 RX ADMIN — Medication 400 MILLIGRAM(S): at 10:26

## 2024-03-26 RX ADMIN — Medication 400 MILLIGRAM(S): at 11:16

## 2024-03-26 NOTE — ED PROVIDER NOTE - OBJECTIVE STATEMENT
43 y/o m presents c/o low back pain.  Pt reports chronic low back pain with no change today, didn't take anything for it.  Pt also asking for food.  Pt denies numbness/tingling to ext, weakness, saddle anesthesia, bowel/bladder incontinence, all other ROS negative.

## 2024-03-26 NOTE — ED PROVIDER NOTE - NEUROLOGICAL, MLM
Alert and oriented, no focal deficits, no motor or sensory deficits, strength 5/5 b/l upper and lower ext, sensation intact distally b/l upper and lower ext

## 2024-03-26 NOTE — ED ADULT TRIAGE NOTE - CHIEF COMPLAINT QUOTE
pt presents to ER c/o back pain for the past two years. pt denies any other complaints. pt ambulating steadily.

## 2024-03-26 NOTE — ED ADULT NURSE NOTE - SUICIDE SCREENING QUESTION 2
Post-Care Instructions: Patient instructed to not lie down for 4 hours and limit physical activity for 24 hours. Map Statement: Please see attached map for locations and injection amounts. Lot #: [id number] 14 units, [id number] 12 units Detail Level: Detailed Expiration Date (Month Year): 11/30/2023 14 units, 09/2023 12 units Consent: Written consent obtained. Risks include but not limited to lid/brow ptosis, bruising, swelling, diplopia, temporary effect, incomplete chemical denervation. Total Units: 26 No

## 2024-03-26 NOTE — ED PROVIDER NOTE - CLINICAL SUMMARY MEDICAL DECISION MAKING FREE TEXT BOX
43 y/o m hx chronic low back pain presents c/o typical low back pain.  Pt ambulatory in ED, no neuro deficits, given ibuprofen, sandwich, d/c

## 2024-03-26 NOTE — ED ADULT NURSE NOTE - OBJECTIVE STATEMENT
Patient c/o of 2 years intermittent backache, w/ headache, no numbness/tingling, no neck tenderness, no other symptom complaint, did not take any pain meds PTA to ED.

## 2024-03-26 NOTE — ED PROVIDER NOTE - PATIENT PORTAL LINK FT
You can access the FollowMyHealth Patient Portal offered by [facility] by registering at the following website: [email or web address]. By joining [employer]’s FollowMyHealth portal, you will also be able to view your health information using other applications (apps) compatible with our system. [place] Counseling: I discussed with the patient the risks of risankizumab-rzaa including but not limited to immunosuppression, and serious infections.  The patient understands that monitoring is required including a PPD at baseline and must alert us or the primary physician if symptoms of infection or other concerning signs are noted.

## 2024-03-27 DIAGNOSIS — M54.50 LOW BACK PAIN, UNSPECIFIED: ICD-10-CM

## 2024-03-27 DIAGNOSIS — G89.29 OTHER CHRONIC PAIN: ICD-10-CM

## 2024-05-10 NOTE — ED ADULT NURSE NOTE - IS THE PATIENT ABLE TO BE SCREENED?
Yes
Quality 431: Preventive Care And Screening: Unhealthy Alcohol Use - Screening: Patient not identified as an unhealthy alcohol user when screened for unhealthy alcohol use using a systematic screening method
Quality 226: Preventive Care And Screening: Tobacco Use: Screening And Cessation Intervention: Patient screened for tobacco use and is an ex/non-smoker
Quality 130: Documentation Of Current Medications In The Medical Record: Current Medications Documented
Detail Level: Detailed

## 2024-06-03 ENCOUNTER — EMERGENCY (EMERGENCY)
Facility: HOSPITAL | Age: 44
LOS: 1 days | Discharge: HOME | End: 2024-06-03
Admitting: STUDENT IN AN ORGANIZED HEALTH CARE EDUCATION/TRAINING PROGRAM
Payer: SELF-PAY

## 2024-06-03 VITALS
TEMPERATURE: 99 F | DIASTOLIC BLOOD PRESSURE: 92 MMHG | WEIGHT: 160.06 LBS | HEART RATE: 76 BPM | OXYGEN SATURATION: 98 % | SYSTOLIC BLOOD PRESSURE: 129 MMHG | RESPIRATION RATE: 18 BRPM

## 2024-06-03 VITALS
OXYGEN SATURATION: 98 % | SYSTOLIC BLOOD PRESSURE: 129 MMHG | RESPIRATION RATE: 18 BRPM | DIASTOLIC BLOOD PRESSURE: 92 MMHG | TEMPERATURE: 99 F | HEART RATE: 76 BPM

## 2024-06-03 DIAGNOSIS — M54.9 DORSALGIA, UNSPECIFIED: ICD-10-CM

## 2024-06-03 DIAGNOSIS — Z59.00 HOMELESSNESS UNSPECIFIED: ICD-10-CM

## 2024-06-03 DIAGNOSIS — G89.29 OTHER CHRONIC PAIN: ICD-10-CM

## 2024-06-03 PROCEDURE — 99283 EMERGENCY DEPT VISIT LOW MDM: CPT

## 2024-06-03 PROCEDURE — 99284 EMERGENCY DEPT VISIT MOD MDM: CPT

## 2024-06-03 RX ORDER — IBUPROFEN 200 MG
600 TABLET ORAL ONCE
Refills: 0 | Status: COMPLETED | OUTPATIENT
Start: 2024-06-03 | End: 2024-06-03

## 2024-06-03 RX ADMIN — Medication 600 MILLIGRAM(S): at 17:33

## 2024-06-03 SDOH — ECONOMIC STABILITY - HOUSING INSECURITY: HOMELESSNESS UNSPECIFIED: Z59.00

## 2024-06-03 NOTE — ED ADULT NURSE NOTE - NSFALLUNIVINTERV_ED_ALL_ED
Bed/Stretcher in lowest position, wheels locked, appropriate side rails in place/Call bell, personal items and telephone in reach/Instruct patient to call for assistance before getting out of bed/chair/stretcher/Non-slip footwear applied when patient is off stretcher/[place] to call system/Physically safe environment - no spills, clutter or unnecessary equipment/Purposeful proactive rounding/Room/bathroom lighting operational, light cord in reach

## 2024-06-03 NOTE — ED ADULT TRIAGE NOTE - CHIEF COMPLAINT QUOTE
pt presents to ER c/o lower back pain for the past 2-3 years. pt ambulating steadily. states he came here to get a "check up."

## 2024-06-03 NOTE — ED PROVIDER NOTE - NSFOLLOWUPINSTRUCTIONS_ED_ALL_ED_FT
Back Pain    Back pain is very common in adults. The cause of back pain is rarely dangerous and the pain often gets better over time. The cause of your back pain may not be known and may include strain of muscles or ligaments, degeneration of the spinal disks, or arthritis. Occasionally the pain may radiate down your leg(s). Over-the-counter medicines to reduce pain and inflammation are often the most helpful. Stretching and remaining active frequently helps the healing process.     SEEK IMMEDIATE MEDICAL CARE IF YOU HAVE ANY OF THE FOLLOWING SYMPTOMS: bowel or bladder control problems, unusual weakness or numbness in your arms or legs, nausea or vomiting, abdominal pain, fever, dizziness/lightheadedness.    Follow up with your primary care doctor or clinics listed below if you do not have a doctor,    [address] [address]  To make an appointment, call [phone]    [facility]  Address: [address] [place] [id number]  Appointment Center: [phone] ([phone])     [address] [place] [address] is a good referral line for crisis and substance abuse help.   has drop in programs all over the [facility].    Return to the ER for Emergencies.  Return immediately for any new or worsening symptoms or any new concerns

## 2024-06-03 NOTE — ED PROVIDER NOTE - OBJECTIVE STATEMENT
44 M undomiciled, pmh chronic back pain p/w acute on chronic back pain, requesting pain meds and food.  reports he only takes something for his pain when he goes to hospital.  occasionally soils himself when he has nowhere to use bathroom but dnies incontinence.  Denies fever, numbness, weakness, difficulty walking, bowel/bladder dysfunction, dysuria, hematuria, saddle anesthesia, h/o CA, h/o IVDA, fall/trauma

## 2024-06-03 NOTE — ED PROVIDER NOTE - PHYSICAL EXAMINATION
Vitals reviewed  Gen: unkempt, comfortable appearing at rest, in nad, speaking in full sentences  Skin: wwp, no rash/lesions  HEENT: ncat, eomi, mmm  Back: no midline ttp/step off, no paraspinal ttp, neg SLR, ROM not limited    CV: rrr, no audible m/r/g  Resp: symmetrical expansion, ctab, no w/r/r  Ext: FROM throughout, no peripheral edema, no muscle or joint ttp, 5/5 strength all ext, SILT equal throughout, distal pulses 2+  Neuro: alert/oriented, no focal deficits, steady gait without assistance

## 2024-06-03 NOTE — ED PROVIDER NOTE - PATIENT PORTAL LINK FT
You can access the FollowMyHealth Patient Portal offered by [facility] by registering at the following website: [email or web address]. By joining [employer]’s FollowMyHealth portal, you will also be able to view your health information using other applications (apps) compatible with our system.

## 2024-06-03 NOTE — ED PROVIDER NOTE - CLINICAL SUMMARY MEDICAL DECISION MAKING FREE TEXT BOX
44 M undomiciled, pmh chronic back pain p/w acute on chronic back pain, requesting pain meds and food.  no injuries.  no red flag sxs.  no midline spinal ttp on exam and NVI x 4 ext.  no indication for emergent imaging.  will give motrin and food, dc to f/u in outpt clinic.  given info for [facility].  discussed strict return parameters

## 2024-06-03 NOTE — ED ADULT NURSE NOTE - OBJECTIVE STATEMENT
Pt A&ox4 and able to speak in complete sentences. Pt breathing even and unlabored with equal chest rise and fall. pt arrived d/t chronic lower back pain over the past three years and endorsed wanting to get checked.
